# Patient Record
Sex: MALE | Race: WHITE | NOT HISPANIC OR LATINO | Employment: OTHER | URBAN - NONMETROPOLITAN AREA
[De-identification: names, ages, dates, MRNs, and addresses within clinical notes are randomized per-mention and may not be internally consistent; named-entity substitution may affect disease eponyms.]

---

## 2021-02-08 ENCOUNTER — TELEPHONE (OUTPATIENT)
Dept: CARDIOLOGY | Facility: CLINIC | Age: 61
End: 2021-02-08

## 2021-02-09 NOTE — TELEPHONE ENCOUNTER
I do not have any records about him, but in general there is not contraindications from the heart., please discuss with PCP

## 2021-02-22 ENCOUNTER — TELEPHONE (OUTPATIENT)
Dept: CARDIOLOGY | Facility: CLINIC | Age: 61
End: 2021-02-22

## 2021-02-22 ENCOUNTER — OFFICE VISIT (OUTPATIENT)
Dept: CARDIOLOGY | Facility: CLINIC | Age: 61
End: 2021-02-22

## 2021-02-22 VITALS
WEIGHT: 205 LBS | BODY MASS INDEX: 28.7 KG/M2 | RESPIRATION RATE: 12 BRPM | DIASTOLIC BLOOD PRESSURE: 76 MMHG | OXYGEN SATURATION: 98 % | HEART RATE: 63 BPM | HEIGHT: 71 IN | SYSTOLIC BLOOD PRESSURE: 94 MMHG

## 2021-02-22 DIAGNOSIS — G47.33 OBSTRUCTIVE SLEEP APNEA ON CPAP: ICD-10-CM

## 2021-02-22 DIAGNOSIS — Z99.89 OBSTRUCTIVE SLEEP APNEA ON CPAP: ICD-10-CM

## 2021-02-22 DIAGNOSIS — I42.0 CARDIOMYOPATHY, DILATED (HCC): ICD-10-CM

## 2021-02-22 DIAGNOSIS — I48.0 PAROXYSMAL ATRIAL FIBRILLATION (HCC): Primary | ICD-10-CM

## 2021-02-22 PROCEDURE — 99443 PR PHYS/QHP TELEPHONE EVALUATION 21-30 MIN: CPT | Performed by: INTERNAL MEDICINE

## 2021-02-22 NOTE — PROGRESS NOTES
[facility]  [facility] CARDIOLOGY  [address] [place] [address]  Dept: [phone]  Dept Fax: [phone]    Reed Clarke Jr.  1960    Televisit Note    You have chosen to receive care through a telephone visit. Do you consent to use a telephone visit for your medical care today? Yes    History of Present Illness:  Reed Clarke Jr. is a 60 y.o. male who presents via phone for Follow-up. Paroxysmal atrial fibrillation-He is 60 year sold and he is s/p cardioversion in 2019 x2 since then on sotalol 80 mg bid, and also Xarelto 20 mg, , no complaints, we need an EKG .     The following portions of the patient's history were reviewed and updated as appropriate: allergies, current medications, past family history, past medical history, past social history, past surgical history and problem list.    This visit was scheduled as a phone visit to comply with patient safety concerns in accordance with CDC recommendations.  Time spent talkinto  the patient was 30 minutes.     Medications  rivaroxaban  sotalol    Subjective  No Known Allergies     Past Medical History:   Diagnosis Date   • BMI 27.0-27.9,adult    • Mild sleep apnea    • Paroxysmal atrial fibrillation (CMS/HCC)    • Secondary cardiomyopathy (CMS/HCC)        Past Surgical History:   Procedure Laterality Date   • TONSILLECTOMY     • WISDOM TOOTH EXTRACTION         Family History   Problem Relation Age of Onset   • Prostate cancer Father    • Diabetes Other         Social History     Socioeconomic History   • Marital status:      Spouse name: Not on file   • Number of children: Not on file   • Years of education: Not on file   • Highest education level: Not on file   Tobacco Use   • Smoking status: Never Smoker   • Smokeless tobacco: Current User     Types: Chew   Substance and Sexual Activity   • Alcohol use: Yes     Comment: once or twice a month   • Drug use: Never   • Sexual activity: Defer       Cardiovascular  "Procedures    ECHO/MUGA:   STRESS TESTS:   CARDIAC CATH:   DEVICES:   HOLTER:   CT/MRI:   VASCULAR:   CARDIOTHORACIC:     Objective  Vitals:    02/22/21 0918   BP: 94/76  Comment: at home   BP Location: Left arm   Patient Position: Sitting   Cuff Size: Adult   Pulse: 63   Resp: 12   SpO2: 98%   Weight: 93 kg (205 lb)   Height: 180.3 cm (71\")   PainSc: 0-No pain     Body mass index is 28.59 kg/m².    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    Andrea Hernández MD  02/22/2021  "

## 2021-02-23 ENCOUNTER — CLINICAL SUPPORT (OUTPATIENT)
Dept: CARDIOLOGY | Facility: CLINIC | Age: 61
End: 2021-02-23

## 2021-02-23 DIAGNOSIS — I48.0 PAROXYSMAL ATRIAL FIBRILLATION (HCC): Primary | ICD-10-CM

## 2021-02-23 DIAGNOSIS — E78.2 MIXED HYPERLIPIDEMIA: ICD-10-CM

## 2021-02-23 DIAGNOSIS — I42.0 CARDIOMYOPATHY, DILATED (HCC): ICD-10-CM

## 2021-02-23 DIAGNOSIS — I48.0 AF (PAROXYSMAL ATRIAL FIBRILLATION) (HCC): Primary | ICD-10-CM

## 2021-02-23 PROCEDURE — 93000 ELECTROCARDIOGRAM COMPLETE: CPT | Performed by: INTERNAL MEDICINE

## 2021-02-23 RX ORDER — SOTALOL HYDROCHLORIDE 80 MG/1
80 TABLET ORAL 2 TIMES DAILY
Qty: 90 TABLET | Refills: 3 | Status: SHIPPED | OUTPATIENT
Start: 2021-02-23 | End: 2021-08-23 | Stop reason: SDUPTHER

## 2021-02-23 NOTE — PROGRESS NOTES
ECG 12 Lead    Date/Time: 2/23/2021 9:56 AM  Performed by: Andrea Hernández MD  Authorized by: Andrea Hernández MD   Comparison: compared with previous ECG from 6/26/2019  Rhythm: sinus bradycardia  Rate: normal  BPM: 54  QRS axis: normal    Clinical impression: normal ECG

## 2021-04-26 DIAGNOSIS — I48.0 AF (PAROXYSMAL ATRIAL FIBRILLATION) (HCC): ICD-10-CM

## 2021-08-23 DIAGNOSIS — I48.0 AF (PAROXYSMAL ATRIAL FIBRILLATION) (HCC): ICD-10-CM

## 2021-08-23 RX ORDER — SOTALOL HYDROCHLORIDE 80 MG/1
80 TABLET ORAL 2 TIMES DAILY
Qty: 180 TABLET | Refills: 1 | Status: SHIPPED | OUTPATIENT
Start: 2021-08-23 | End: 2022-02-21 | Stop reason: SDUPTHER

## 2021-08-31 ENCOUNTER — OFFICE VISIT (OUTPATIENT)
Dept: CARDIOLOGY | Facility: CLINIC | Age: 61
End: 2021-08-31

## 2021-08-31 VITALS
HEIGHT: 71 IN | SYSTOLIC BLOOD PRESSURE: 114 MMHG | OXYGEN SATURATION: 97 % | DIASTOLIC BLOOD PRESSURE: 70 MMHG | WEIGHT: 200 LBS | HEART RATE: 57 BPM | RESPIRATION RATE: 15 BRPM | BODY MASS INDEX: 28 KG/M2 | TEMPERATURE: 97.5 F

## 2021-08-31 DIAGNOSIS — I42.0 CARDIOMYOPATHY, DILATED (HCC): ICD-10-CM

## 2021-08-31 DIAGNOSIS — I34.0 NONRHEUMATIC MITRAL VALVE REGURGITATION: ICD-10-CM

## 2021-08-31 DIAGNOSIS — G47.33 OBSTRUCTIVE SLEEP APNEA ON CPAP: ICD-10-CM

## 2021-08-31 DIAGNOSIS — Z99.89 OBSTRUCTIVE SLEEP APNEA ON CPAP: ICD-10-CM

## 2021-08-31 DIAGNOSIS — I48.0 PAROXYSMAL ATRIAL FIBRILLATION (HCC): Primary | ICD-10-CM

## 2021-08-31 PROCEDURE — 99214 OFFICE O/P EST MOD 30 MIN: CPT | Performed by: INTERNAL MEDICINE

## 2021-08-31 PROCEDURE — 93000 ELECTROCARDIOGRAM COMPLETE: CPT | Performed by: INTERNAL MEDICINE

## 2021-08-31 NOTE — PROGRESS NOTES
[facility] [place]  [facility] CARDIOLOGY  [address] [place] [address]  Dept: [phone]  Dept Fax: [phone]    Reed Clarke Jr.  1960    Follow Up Office Visit Note    History of Present Illness:  Reed Clarke Jr. is a 61 y.o. male who presents to the clinic for Follow-up. PAF- He is s.p cardioversion in 2019,    Since then doing good on sotalol 80 mg bid, and Xarelto 20 mg, , his EKG sinus rhythm Hr 51 . Qt is fine, he has occasional some palpitations, that last 5-10 minutes, , he has the Kardia device., will see     The following portions of the patient's history were reviewed and updated as appropriate: allergies, current medications, past family history, past medical history, past social history, past surgical history and problem list.    Medications:  rivaroxaban  sotalol    Subjective  No Known Allergies     Past Medical History:   Diagnosis Date   • BMI 27.0-27.9,adult    • Mild sleep apnea    • Paroxysmal atrial fibrillation (CMS/HCC)    • Secondary cardiomyopathy (CMS/HCC)        Past Surgical History:   Procedure Laterality Date   • TONSILLECTOMY     • WISDOM TOOTH EXTRACTION         Family History   Problem Relation Age of Onset   • Prostate cancer Father    • Diabetes Other         Social History     Socioeconomic History   • Marital status:      Spouse name: Not on file   • Number of children: Not on file   • Years of education: Not on file   • Highest education level: Not on file   Tobacco Use   • Smoking status: Never Smoker   • Smokeless tobacco: Current User     Types: Chew   Substance and Sexual Activity   • Alcohol use: Yes     Comment: once or twice a month   • Drug use: Never   • Sexual activity: Defer       Review of Systems   Constitutional: Negative.    HENT: Negative.    Respiratory: Negative.    Cardiovascular: Positive for palpitations.   Endocrine: Negative.    Genitourinary: Negative.    Musculoskeletal: Negative.    Skin: Negative.   "  Allergic/Immunologic: Negative.    Neurological: Negative.    Hematological: Negative.    Psychiatric/Behavioral: Negative.    All other systems reviewed and are negative.      Cardiovascular Procedures    ECHO/MUGA:   STRESS TESTS:   CARDIAC CATH:   DEVICES:   HOLTER:   CT/MRI:   VASCULAR:   CARDIOTHORACIC:     Objective  Vitals:    08/31/21 1525   BP: 114/70   BP Location: Left arm   Patient Position: Sitting   Cuff Size: Adult   Pulse: 57   Resp: 15   Temp: 97.5 °F (36.4 °C)   TempSrc: Infrared   SpO2: 97%   Weight: 90.7 kg (200 lb)   Height: 180.3 cm (71\")   PainSc: 0-No pain     Body mass index is 27.89 kg/m².     Physical Exam  Constitutional:       Appearance: Healthy appearance. Not in distress.   Neck:      Vascular: No JVR. JVD normal.   Pulmonary:      Effort: Pulmonary effort is normal.      Breath sounds: Normal breath sounds. No wheezing. No rhonchi. No rales.   Chest:      Chest wall: Not tender to palpatation.   Cardiovascular:      PMI at left midclavicular line. Normal rate. Regular rhythm. Normal S1. Normal S2.      Murmurs: There is no murmur.      No gallop. No click. No rub.   Pulses:     Intact distal pulses.   Edema:     Peripheral edema absent.   Abdominal:      General: Bowel sounds are normal.      Palpations: Abdomen is soft.      Tenderness: There is no abdominal tenderness.   Musculoskeletal: Normal range of motion.         General: No tenderness. Skin:     General: Skin is warm and dry.   Neurological:      General: No focal deficit present.      Mental Status: Alert and oriented to person, place and time.          Diagnostic Data    ECG 12 Lead    Date/Time: 8/31/2021 4:04 PM  Performed by: Andrea Hernández MD  Authorized by: Andrea Hernández MD   Rhythm: sinus rhythm and sinus bradycardia  Rate: bradycardic  BPM: 51  QRS axis: normal    Clinical impression: normal ECG            Assessment and Plan  Diagnoses and all orders for this visit:    Paroxysmal atrial " fibrillation (CMS/HCC)- s.po Cardioversion 2019 doing well, on Sotalol 80 mg bid and Xarelto 20 mg    Cardiomyopathy, dilated (CMS/HCC)- his Ef recover back to normal, asymptomatic    Obstructive sleep apnea on CPAP-  Mitral regurgitation- Moderate mR by last echo 2020, asymptomatic, no SOB, no edema,          No follow-ups on file.    Andrea Hernández MD  08/31/2021

## 2022-02-21 ENCOUNTER — TELEPHONE (OUTPATIENT)
Dept: CARDIOLOGY | Facility: CLINIC | Age: 62
End: 2022-02-21

## 2022-02-21 DIAGNOSIS — I48.0 AF (PAROXYSMAL ATRIAL FIBRILLATION): ICD-10-CM

## 2022-02-21 RX ORDER — SOTALOL HYDROCHLORIDE 80 MG/1
80 TABLET ORAL 2 TIMES DAILY
Qty: 60 TABLET | Refills: 0 | Status: SHIPPED | OUTPATIENT
Start: 2022-02-21 | End: 2022-03-28 | Stop reason: SDUPTHER

## 2022-03-01 ENCOUNTER — OFFICE VISIT (OUTPATIENT)
Dept: CARDIOLOGY | Facility: CLINIC | Age: 62
End: 2022-03-01

## 2022-03-01 VITALS
WEIGHT: 200 LBS | BODY MASS INDEX: 28 KG/M2 | DIASTOLIC BLOOD PRESSURE: 82 MMHG | HEIGHT: 71 IN | OXYGEN SATURATION: 99 % | TEMPERATURE: 98 F | HEART RATE: 59 BPM | SYSTOLIC BLOOD PRESSURE: 138 MMHG | RESPIRATION RATE: 14 BRPM

## 2022-03-01 DIAGNOSIS — I48.0 AF (PAROXYSMAL ATRIAL FIBRILLATION): ICD-10-CM

## 2022-03-01 DIAGNOSIS — I34.0 NONRHEUMATIC MITRAL VALVE REGURGITATION: ICD-10-CM

## 2022-03-01 DIAGNOSIS — G47.33 OBSTRUCTIVE SLEEP APNEA ON CPAP: ICD-10-CM

## 2022-03-01 DIAGNOSIS — I42.0 CARDIOMYOPATHY, DILATED: Primary | ICD-10-CM

## 2022-03-01 DIAGNOSIS — Z99.89 OBSTRUCTIVE SLEEP APNEA ON CPAP: ICD-10-CM

## 2022-03-01 PROCEDURE — 99214 OFFICE O/P EST MOD 30 MIN: CPT | Performed by: INTERNAL MEDICINE

## 2022-03-01 PROCEDURE — 93000 ELECTROCARDIOGRAM COMPLETE: CPT | Performed by: INTERNAL MEDICINE

## 2022-03-01 RX ORDER — ASPIRIN 81 MG/1
81 TABLET ORAL DAILY
Qty: 90 TABLET | Refills: 3 | Status: SHIPPED | OUTPATIENT
Start: 2022-03-01 | End: 2022-09-01

## 2022-03-01 NOTE — PROGRESS NOTES
[facility]  [facility] CARDIOLOGY  [address] [place] [address]  Dept: [phone]  Dept Fax: [phone]    Reed Calrke Jr.  1960    Follow Up Office Visit Note    History of Present Illness:  Reed Clarke Jr. is a 61 y.o. male who presents to the clinic for Follow-up. Paroxysmal atrial fibrillation- He is asymptomatic, since cardioversion, 2019, no complaints, on Sotalol 80 mg bid, his Ef recover to normal his FINN score is 0 now, will change Xarelto to ASA.    The following portions of the patient's history were reviewed and updated as appropriate: allergies, current medications, past family history, past medical history, past social history, past surgical history and problem list.    Medications:  aspirin  sotalol    Subjective  No Known Allergies     Past Medical History:   Diagnosis Date   • BMI 27.0-27.9,adult    • Mild sleep apnea    • Paroxysmal atrial fibrillation (HCC)    • Secondary cardiomyopathy (HCC)        Past Surgical History:   Procedure Laterality Date   • TONSILLECTOMY     • WISDOM TOOTH EXTRACTION         Family History   Problem Relation Age of Onset   • Prostate cancer Father    • Diabetes Other         Social History     Socioeconomic History   • Marital status:    Tobacco Use   • Smoking status: Never Smoker   • Smokeless tobacco: Current User     Types: Chew   Vaping Use   • Vaping Use: Never used   Substance and Sexual Activity   • Alcohol use: Yes     Comment: once or twice a month   • Drug use: Never   • Sexual activity: Defer       Review of Systems   Constitutional: Negative.    HENT: Negative.    Respiratory: Negative.    Cardiovascular: Negative.    Endocrine: Negative.    Genitourinary: Negative.    Musculoskeletal: Negative.    Skin: Negative.    Allergic/Immunologic: Negative.    Neurological: Negative.    Hematological: Negative.    Psychiatric/Behavioral: Negative.        Cardiovascular Procedures    ECHO/MUGA:   STRESS  "TESTS:   CARDIAC CATH:   DEVICES:   HOLTER:   CT/MRI:   VASCULAR:   CARDIOTHORACIC:     Objective  Vitals:    03/01/22 1009   BP: 138/82   BP Location: Left arm   Patient Position: Lying   Cuff Size: Adult   Pulse: 59   Resp: 14   Temp: 98 °F (36.7 °C)   TempSrc: Infrared   SpO2: 99%   Weight: 90.7 kg (200 lb)   Height: 180.3 cm (71\")   PainSc: 0-No pain     Body mass index is 27.89 kg/m².     Physical Exam  Constitutional:       Appearance: Healthy appearance. Not in distress.   Neck:      Vascular: No JVR. JVD normal.   Pulmonary:      Effort: Pulmonary effort is normal.      Breath sounds: Normal breath sounds. No wheezing. No rhonchi. No rales.   Chest:      Chest wall: Not tender to palpatation.   Cardiovascular:      PMI at left midclavicular line. Normal rate. Regular rhythm. Normal S1. Normal S2.      Murmurs: There is no murmur.      No gallop. No click. No rub.   Pulses:     Intact distal pulses.   Edema:     Peripheral edema absent.   Abdominal:      General: Bowel sounds are normal.      Palpations: Abdomen is soft.      Tenderness: There is no abdominal tenderness.   Musculoskeletal: Normal range of motion.         General: No tenderness. Skin:     General: Skin is warm and dry.   Neurological:      General: No focal deficit present.      Mental Status: Alert and oriented to person, place and time.          Diagnostic Data    ECG 12 Lead    Date/Time: 3/1/2022 10:35 AM  Performed by: Andrea Hernández MD  Authorized by: Andrea Hernández MD   Comparison: compared with previous ECG from 8/31/2021  Similar to previous ECG  Comparison to previous ECG:   Rhythm: sinus rhythm and sinus bradycardia  Rate: bradycardic  BPM: 53  QRS axis: normal    Clinical impression: normal ECG            Assessment and Plan  Diagnoses and all orders for this visit:      Obstructive sleep apnea on CPAP    Nonrheumatic mitral valve regurgitation- Asymptomatic, last echo moderate MR 2020    AF (paroxysmal atrial " fibrillation) (HCC)- in Sinus doing well, no complaints on Sotalol 80 mg bid QT is fine, his FINN score is 0, will change to ASA    Other orders  -     aspirin (aspirin) 81 MG EC tablet; Take 1 tablet by mouth Daily.         Return in about 6 months (around 9/1/2022) for Recheck.    Andrea Hernández MD  03/01/2022

## 2022-03-11 ENCOUNTER — TELEPHONE (OUTPATIENT)
Dept: CARDIOLOGY | Facility: CLINIC | Age: 62
End: 2022-03-11

## 2022-03-11 NOTE — TELEPHONE ENCOUNTER
Spoke with Mr. Clarke and advised him that per Dr. box you can stop Xarelto today and start taking your aspirin tomorrow. He verbalized understanding.

## 2022-03-11 NOTE — TELEPHONE ENCOUNTER
SHOULD HE FINISH XERLTO AND START BABY ASPRIN  OR TAKE BABY ASPRIN AND XERLTO AT THE SAME TIME HE NEEDS DIRECTIONS     PLEASE CALL HIM    [phone]

## 2022-03-28 ENCOUNTER — TELEPHONE (OUTPATIENT)
Dept: CARDIOLOGY | Facility: CLINIC | Age: 62
End: 2022-03-28

## 2022-03-28 DIAGNOSIS — I48.0 AF (PAROXYSMAL ATRIAL FIBRILLATION): ICD-10-CM

## 2022-03-28 RX ORDER — SOTALOL HYDROCHLORIDE 80 MG/1
80 TABLET ORAL 2 TIMES DAILY
Qty: 180 TABLET | Refills: 1 | Status: SHIPPED | OUTPATIENT
Start: 2022-03-28 | End: 2022-09-01 | Stop reason: SDUPTHER

## 2022-03-28 NOTE — TELEPHONE ENCOUNTER
sotalol (BETAPACE) 80 MG tablet      [facility] - [place], [place] - [address] AT [address] [facility] - [phone]  - [phone] FX

## 2022-07-21 ENCOUNTER — TELEPHONE (OUTPATIENT)
Dept: FAMILY MEDICINE CLINIC | Facility: CLINIC | Age: 62
End: 2022-07-21

## 2022-07-21 NOTE — TELEPHONE ENCOUNTER
Pt called, tested positive for Covid, wanted a telehealth appt but none available. So he asked if a docotr could call him and send in the anti-viral med.     [phone]

## 2022-07-26 NOTE — TELEPHONE ENCOUNTER
Called and talked to patient.  He tested positive for COVID last Thursday.  He has been sick for about 6 days.  Told him he is outside the window for any treatment.  He is feeling better but if he gets worse to go to emergency room

## 2022-09-01 ENCOUNTER — OFFICE VISIT (OUTPATIENT)
Dept: CARDIOLOGY | Facility: CLINIC | Age: 62
End: 2022-09-01

## 2022-09-01 ENCOUNTER — TELEPHONE (OUTPATIENT)
Dept: CARDIOLOGY | Facility: CLINIC | Age: 62
End: 2022-09-01

## 2022-09-01 VITALS
SYSTOLIC BLOOD PRESSURE: 118 MMHG | HEART RATE: 84 BPM | BODY MASS INDEX: 28.98 KG/M2 | HEIGHT: 71 IN | DIASTOLIC BLOOD PRESSURE: 82 MMHG | WEIGHT: 207 LBS | TEMPERATURE: 97.8 F | RESPIRATION RATE: 18 BRPM

## 2022-09-01 DIAGNOSIS — I34.0 NONRHEUMATIC MITRAL VALVE REGURGITATION: ICD-10-CM

## 2022-09-01 DIAGNOSIS — Z99.89 OBSTRUCTIVE SLEEP APNEA ON CPAP: ICD-10-CM

## 2022-09-01 DIAGNOSIS — I48.0 PAROXYSMAL ATRIAL FIBRILLATION: Primary | ICD-10-CM

## 2022-09-01 DIAGNOSIS — I42.0 CARDIOMYOPATHY, DILATED: ICD-10-CM

## 2022-09-01 DIAGNOSIS — G47.33 OBSTRUCTIVE SLEEP APNEA ON CPAP: ICD-10-CM

## 2022-09-01 DIAGNOSIS — I48.0 AF (PAROXYSMAL ATRIAL FIBRILLATION): ICD-10-CM

## 2022-09-01 PROCEDURE — 99214 OFFICE O/P EST MOD 30 MIN: CPT | Performed by: INTERNAL MEDICINE

## 2022-09-01 PROCEDURE — 93000 ELECTROCARDIOGRAM COMPLETE: CPT | Performed by: INTERNAL MEDICINE

## 2022-09-01 RX ORDER — SOTALOL HYDROCHLORIDE 160 MG/1
80 TABLET ORAL 2 TIMES DAILY
Qty: 180 TABLET | Refills: 2 | Status: SHIPPED | OUTPATIENT
Start: 2022-09-01 | End: 2022-09-08 | Stop reason: SDUPTHER

## 2022-09-01 NOTE — TELEPHONE ENCOUNTER
If pt calls back please tell him that we are going to do an echo. Yani will call him and schedule it . Hub can tell pt

## 2022-09-01 NOTE — PROGRESS NOTES
[facility]  [facility] CARDIOLOGY  [address] [place] [address]  Dept: [phone]  Dept Fax: [phone]    Reed Clarke Jr.  1960    Follow Up Office Visit Note    History of Present Illness:  Reed Clarke Jr. is a 62 y.o. male who presents to the clinic for paroxysmal atrial fibrillation- He has had the COVID last month, since then his monitor indicates a irregular heart beat, he denies any complaints, no chest pain, no SOB, BP is 120.80, he is back to AF he has been taking Sotalol 80 mg bid and QT is fine 410, Hr is 76., will increase Sotalol to 160 mg bid, will restart Xarelto due to possible cardioversion, he did have tachycardia induced cardiomyopathy in the past with recover of the Ef, he has cardioversion in 2019, might need to go for ablation after     The following portions of the patient's history were reviewed and updated as appropriate: allergies, current medications, past family history, past medical history, past social history, past surgical history and problem list.    Medications:  rivaroxaban  sotalol    Subjective  No Known Allergies     Past Medical History:   Diagnosis Date   • BMI 27.0-27.9,adult    • Mild sleep apnea    • Paroxysmal atrial fibrillation (HCC)    • Secondary cardiomyopathy (HCC)        Past Surgical History:   Procedure Laterality Date   • TONSILLECTOMY     • WISDOM TOOTH EXTRACTION         Family History   Problem Relation Age of Onset   • Prostate cancer Father    • Diabetes Other         Social History     Socioeconomic History   • Marital status:    Tobacco Use   • Smoking status: Never Smoker   • Smokeless tobacco: Current User     Types: Chew   Vaping Use   • Vaping Use: Never used   Substance and Sexual Activity   • Alcohol use: Yes     Comment: once or twice a month   • Drug use: Never   • Sexual activity: Defer       Review of Systems   Constitutional: Negative.    HENT: Negative.    Respiratory: Negative.   "  Cardiovascular: Negative.    Endocrine: Negative.    Genitourinary: Negative.    Musculoskeletal: Negative.    Skin: Negative.    Allergic/Immunologic: Negative.    Neurological: Negative.    Hematological: Negative.    Psychiatric/Behavioral: Negative.        Cardiovascular Procedures    ECHO/MUGA:   STRESS TESTS:   CARDIAC CATH:   DEVICES:   HOLTER:   CT/MRI:   VASCULAR:   CARDIOTHORACIC:     Objective  Vitals:    09/01/22 1536   BP: 118/82   Pulse: 84   Resp: 18   Temp: 97.8 °F (36.6 °C)   Weight: 93.9 kg (207 lb)   Height: 180.3 cm (70.98\")     Body mass index is 28.88 kg/m².     Physical Exam  Constitutional:       Appearance: Healthy appearance. Not in distress.   Neck:      Vascular: No JVR. JVD normal.   Pulmonary:      Effort: Pulmonary effort is normal.      Breath sounds: Normal breath sounds. No wheezing. No rhonchi. No rales.   Chest:      Chest wall: Not tender to palpatation.   Cardiovascular:      PMI at left midclavicular line. Normal rate. Irregularly irregular rhythm. Normal S1. Normal S2.      Murmurs: There is no murmur.      No gallop. No click. No rub.   Pulses:     Intact distal pulses.   Edema:     Peripheral edema absent.   Abdominal:      General: Bowel sounds are normal.      Palpations: Abdomen is soft.      Tenderness: There is no abdominal tenderness.   Musculoskeletal: Normal range of motion.         General: No tenderness. Skin:     General: Skin is warm and dry.   Neurological:      General: No focal deficit present.      Mental Status: Alert and oriented to person, place and time.          Diagnostic Data    ECG 12 Lead    Date/Time: 9/1/2022 4:10 PM  Performed by: Andrea Hernández MD  Authorized by: Andrea Hernández MD   Comparison: compared with previous ECG from 3/1/2022  Rhythm: atrial fibrillation  Rate: normal  BPM: 76  QRS axis: normal    Clinical impression: abnormal EKG            Assessment and Plan  Diagnoses and all orders for this visit:    Paroxysmal " atrial fibrillation (HCC)= - He is back to AF asymptomatic, will increase Sotalol to 160 mg bid , and restart Xarelto 20 mg daily    Nonrheumatic mitral valve regurgitation- moderate MR by last echo, asymptomatic,    Cardiomyopathy, dilated (HCC)- With recovery of the Ef to normal, will get a new echo    Obstructive sleep apnea on CPAP        Other orders  -     rivaroxaban (Xarelto) 20 MG tablet; Take 1 tablet by mouth Daily.         Return in about 1 week (around 9/8/2022) for Recheck.    Andrea Hernández MD  09/01/2022

## 2022-09-08 ENCOUNTER — TELEPHONE (OUTPATIENT)
Dept: CARDIOLOGY | Facility: CLINIC | Age: 62
End: 2022-09-08

## 2022-09-08 ENCOUNTER — OFFICE VISIT (OUTPATIENT)
Dept: CARDIOLOGY | Facility: CLINIC | Age: 62
End: 2022-09-08

## 2022-09-08 VITALS
DIASTOLIC BLOOD PRESSURE: 74 MMHG | HEIGHT: 70 IN | RESPIRATION RATE: 12 BRPM | WEIGHT: 206.2 LBS | SYSTOLIC BLOOD PRESSURE: 128 MMHG | TEMPERATURE: 97.5 F | OXYGEN SATURATION: 97 % | BODY MASS INDEX: 29.52 KG/M2 | HEART RATE: 80 BPM

## 2022-09-08 DIAGNOSIS — Z99.89 OBSTRUCTIVE SLEEP APNEA ON CPAP: ICD-10-CM

## 2022-09-08 DIAGNOSIS — I42.0 CARDIOMYOPATHY, DILATED: Primary | ICD-10-CM

## 2022-09-08 DIAGNOSIS — I48.0 AF (PAROXYSMAL ATRIAL FIBRILLATION): ICD-10-CM

## 2022-09-08 DIAGNOSIS — G47.33 OBSTRUCTIVE SLEEP APNEA ON CPAP: ICD-10-CM

## 2022-09-08 DIAGNOSIS — I34.0 NONRHEUMATIC MITRAL VALVE REGURGITATION: ICD-10-CM

## 2022-09-08 PROCEDURE — 99214 OFFICE O/P EST MOD 30 MIN: CPT | Performed by: INTERNAL MEDICINE

## 2022-09-08 PROCEDURE — 93000 ELECTROCARDIOGRAM COMPLETE: CPT | Performed by: INTERNAL MEDICINE

## 2022-09-08 RX ORDER — SOTALOL HYDROCHLORIDE 160 MG/1
160 TABLET ORAL 2 TIMES DAILY
Qty: 180 TABLET | Refills: 2 | Status: SHIPPED | OUTPATIENT
Start: 2022-09-08 | End: 2022-10-24 | Stop reason: SDUPTHER

## 2022-09-08 NOTE — PROGRESS NOTES
[facility]  [facility] CARDIOLOGY  [address] [place] [address]  Dept: [phone]  Dept Fax: [phone]    Reed Clarke Jr.  1960    Follow Up Office Visit Note    History of Present Illness:  Reed Clarke Jr. is a 62 y.o. male who presents to the clinic for Follow-up. Paroxysmal atrial fibrillation- He is asymptomatic, still on AF, echo normal EF, despite increasing Sotalol to 160 mg bid, , he is also  on Xarelto 20 mg daily    The following portions of the patient's history were reviewed and updated as appropriate: allergies, current medications, past family history, past medical history, past social history, past surgical history and problem list.    Medications:  rivaroxaban  sotalol    Subjective  No Known Allergies     Past Medical History:   Diagnosis Date   • BMI 27.0-27.9,adult    • Mild sleep apnea    • Paroxysmal atrial fibrillation (HCC)    • Secondary cardiomyopathy (HCC)        Past Surgical History:   Procedure Laterality Date   • TONSILLECTOMY     • WISDOM TOOTH EXTRACTION         Family History   Problem Relation Age of Onset   • Prostate cancer Father    • Diabetes Other         Social History     Socioeconomic History   • Marital status:    Tobacco Use   • Smoking status: Never Smoker   • Smokeless tobacco: Current User     Types: Chew   Vaping Use   • Vaping Use: Never used   Substance and Sexual Activity   • Alcohol use: Yes     Comment: once or twice a month   • Drug use: Never   • Sexual activity: Defer       Review of Systems   Constitutional: Negative.    HENT: Negative.    Respiratory: Negative.    Cardiovascular: Negative.    Endocrine: Negative.    Genitourinary: Negative.    Musculoskeletal: Negative.    Skin: Negative.    Allergic/Immunologic: Negative.    Neurological: Negative.    Hematological: Negative.    Psychiatric/Behavioral: Negative.        Cardiovascular Procedures    ECHO/MUGA:   STRESS TESTS:   CARDIAC CATH:   DEVICES:  "  HOLTER:   CT/MRI:   VASCULAR:   CARDIOTHORACIC:     Objective  Vitals:    09/08/22 0833   BP: 128/74   BP Location: Left arm   Patient Position: Lying   Cuff Size: Adult   Pulse: 80   Resp: 12   Temp: 97.5 °F (36.4 °C)   TempSrc: Infrared   SpO2: 97%   Weight: 93.5 kg (206 lb 3.2 oz)   Height: 177.8 cm (70\")     Body mass index is 29.59 kg/m².     Physical Exam  Constitutional:       Appearance: Healthy appearance. Not in distress.   Neck:      Vascular: No JVR. JVD normal.   Pulmonary:      Effort: Pulmonary effort is normal.      Breath sounds: Normal breath sounds. No wheezing. No rhonchi. No rales.   Chest:      Chest wall: Not tender to palpatation.   Cardiovascular:      PMI at left midclavicular line. Normal rate. Irregularly irregular rhythm. Normal S1. Normal S2.      Murmurs: There is no murmur.      No gallop. No click. No rub.   Pulses:     Intact distal pulses.   Edema:     Peripheral edema absent.   Abdominal:      General: Bowel sounds are normal.      Palpations: Abdomen is soft.      Tenderness: There is no abdominal tenderness.   Musculoskeletal: Normal range of motion.         General: No tenderness. Skin:     General: Skin is warm and dry.   Neurological:      General: No focal deficit present.      Mental Status: Alert and oriented to person, place and time.          Diagnostic Data    ECG 12 Lead    Date/Time: 9/8/2022 9:05 AM  Performed by: Andrea Hernández MD  Authorized by: Andrea Hernández MD   Comparison: compared with previous ECG from 9/1/2022  Similar to previous ECG  Rhythm: atrial fibrillation  Rate: normal  BPM: 72  QRS axis: normal  Other findings: non-specific ST-T wave changes    Clinical impression: normal ECG            Assessment and Plan  Diagnoses and all orders for this visit:    Cardiomyopathy, dilated (HCC)- With normalization of the Ef 55%,this was due to tachycardia induce cardiomyopathy  -     CBC & Differential  -     Comprehensive Metabolic " Panel    Nonrheumatic mitral valve regurgitation- Just Mild MR,    Obstructive sleep apnea on CPAP    AF (paroxysmal atrial fibrillation) (HCC)- Despite Sotalol 160 mg bid still on AF, will do cardioversion, keep Sotalol 160 mg bid QT is 403, keep Xarelto  -     sotalol (BETAPACE) 160 MG tablet; Take 1 tablet by mouth 2 (Two) Times a Day.  -     CBC & Differential  -     Comprehensive Metabolic Panel  -     Cardioversion External in Cardiology Department; Future         No follow-ups on file.    Andrea Hernández MD  09/08/2022

## 2022-09-09 ENCOUNTER — TELEPHONE (OUTPATIENT)
Dept: CARDIOLOGY | Facility: CLINIC | Age: 62
End: 2022-09-09

## 2022-09-09 LAB
ALBUMIN SERPL-MCNC: 4.3 G/DL (ref 3.8–4.8)
ALBUMIN/GLOB SERPL: 1.5 {RATIO} (ref 1.2–2.2)
ALP SERPL-CCNC: 83 IU/L (ref 44–121)
ALT SERPL-CCNC: 16 IU/L (ref 0–44)
AST SERPL-CCNC: 17 IU/L (ref 0–40)
BASOPHILS # BLD AUTO: 0 X10E3/UL (ref 0–0.2)
BASOPHILS NFR BLD AUTO: 1 %
BILIRUB SERPL-MCNC: 0.3 MG/DL (ref 0–1.2)
BUN SERPL-MCNC: 14 MG/DL (ref 8–27)
BUN/CREAT SERPL: 15 (ref 10–24)
CALCIUM SERPL-MCNC: 9.7 MG/DL (ref 8.6–10.2)
CHLORIDE SERPL-SCNC: 106 MMOL/L (ref 96–106)
CO2 SERPL-SCNC: 25 MMOL/L (ref 20–29)
CREAT SERPL-MCNC: 0.95 MG/DL (ref 0.76–1.27)
EGFRCR-CYS SERPLBLD CKD-EPI 2021: 90 ML/MIN/1.73
EOSINOPHIL # BLD AUTO: 0.2 X10E3/UL (ref 0–0.4)
EOSINOPHIL NFR BLD AUTO: 3 %
ERYTHROCYTE [DISTWIDTH] IN BLOOD BY AUTOMATED COUNT: 13.2 % (ref 11.6–15.4)
GLOBULIN SER CALC-MCNC: 2.8 G/DL (ref 1.5–4.5)
GLUCOSE SERPL-MCNC: 101 MG/DL (ref 65–99)
HCT VFR BLD AUTO: 47 % (ref 37.5–51)
HGB BLD-MCNC: 15.5 G/DL (ref 13–17.7)
IMM GRANULOCYTES # BLD AUTO: 0 X10E3/UL (ref 0–0.1)
IMM GRANULOCYTES NFR BLD AUTO: 0 %
LYMPHOCYTES # BLD AUTO: 1.9 X10E3/UL (ref 0.7–3.1)
LYMPHOCYTES NFR BLD AUTO: 28 %
MCH RBC QN AUTO: 29.1 PG (ref 26.6–33)
MCHC RBC AUTO-ENTMCNC: 33 G/DL (ref 31.5–35.7)
MCV RBC AUTO: 88 FL (ref 79–97)
MONOCYTES # BLD AUTO: 0.6 X10E3/UL (ref 0.1–0.9)
MONOCYTES NFR BLD AUTO: 8 %
NEUTROPHILS # BLD AUTO: 4.1 X10E3/UL (ref 1.4–7)
NEUTROPHILS NFR BLD AUTO: 60 %
PLATELET # BLD AUTO: 316 X10E3/UL (ref 150–450)
POTASSIUM SERPL-SCNC: 4.4 MMOL/L (ref 3.5–5.2)
PROT SERPL-MCNC: 7.1 G/DL (ref 6–8.5)
RBC # BLD AUTO: 5.32 X10E6/UL (ref 4.14–5.8)
SODIUM SERPL-SCNC: 144 MMOL/L (ref 134–144)
WBC # BLD AUTO: 6.9 X10E3/UL (ref 3.4–10.8)

## 2022-09-09 NOTE — TELEPHONE ENCOUNTER
Pt called and scheduled for Cardioversion only at [facility] for Friday the 16th at noon, to arrive at 11am front registration office. Nothing to eat or drink after midnight, need to take your medicines morning of procedure. You will need a . Pt verbalized understanding. Advised cath lab that patient confirmed.

## 2022-09-12 ENCOUNTER — TELEPHONE (OUTPATIENT)
Dept: CARDIOLOGY | Facility: CLINIC | Age: 62
End: 2022-09-12

## 2022-09-12 NOTE — TELEPHONE ENCOUNTER
Left a message for Mr. Clarke that he should come to Dr. box's office sometime on Thursday for an EKG to make sure he is still in Afib prior to his cardioversion on  Friday.  He can talk with Yvette.

## 2022-09-15 ENCOUNTER — TELEPHONE (OUTPATIENT)
Dept: CARDIOLOGY | Facility: CLINIC | Age: 62
End: 2022-09-15

## 2022-09-15 ENCOUNTER — CLINICAL SUPPORT (OUTPATIENT)
Dept: CARDIOLOGY | Facility: CLINIC | Age: 62
End: 2022-09-15

## 2022-09-15 DIAGNOSIS — I48.0 PAROXYSMAL ATRIAL FIBRILLATION: Primary | ICD-10-CM

## 2022-09-15 PROCEDURE — 93000 ELECTROCARDIOGRAM COMPLETE: CPT | Performed by: INTERNAL MEDICINE

## 2022-09-15 NOTE — PROGRESS NOTES
ECG 12 Lead    Date/Time: 9/15/2022 9:04 AM  Performed by: Andrea Hernández MD  Authorized by: Andrea Hernández MD   Comparison: compared with previous ECG from 9/8/2022  Comparison to previous ECG:   Rhythm: sinus rhythm and sinus bradycardia  Rate: bradycardic  BPM: 48  QRS axis: normal    Clinical impression: normal ECG

## 2022-09-20 ENCOUNTER — OFFICE VISIT (OUTPATIENT)
Dept: CARDIOLOGY | Facility: CLINIC | Age: 62
End: 2022-09-20

## 2022-09-20 VITALS
RESPIRATION RATE: 18 BRPM | DIASTOLIC BLOOD PRESSURE: 74 MMHG | TEMPERATURE: 97.4 F | SYSTOLIC BLOOD PRESSURE: 112 MMHG | WEIGHT: 206 LBS | HEIGHT: 70 IN | HEART RATE: 56 BPM | BODY MASS INDEX: 29.49 KG/M2 | OXYGEN SATURATION: 98 %

## 2022-09-20 DIAGNOSIS — I34.0 NONRHEUMATIC MITRAL VALVE REGURGITATION: ICD-10-CM

## 2022-09-20 DIAGNOSIS — I42.0 CARDIOMYOPATHY, DILATED: Primary | ICD-10-CM

## 2022-09-20 DIAGNOSIS — G47.33 OBSTRUCTIVE SLEEP APNEA ON CPAP: ICD-10-CM

## 2022-09-20 DIAGNOSIS — I48.0 PAROXYSMAL ATRIAL FIBRILLATION: ICD-10-CM

## 2022-09-20 DIAGNOSIS — Z99.89 OBSTRUCTIVE SLEEP APNEA ON CPAP: ICD-10-CM

## 2022-09-20 PROCEDURE — 93000 ELECTROCARDIOGRAM COMPLETE: CPT | Performed by: INTERNAL MEDICINE

## 2022-09-20 PROCEDURE — 99214 OFFICE O/P EST MOD 30 MIN: CPT | Performed by: INTERNAL MEDICINE

## 2022-09-20 NOTE — PROGRESS NOTES
[facility] [place]  [facility]OLOG[facility]  [address] [place] [address]  Dept: [phone]  Dept Fax: [phone]    Reed Clarke Jr.  1960    Follow Up Office Visit Note    History of Present Illness:  Reed Clarke Jr. is a 62 y.o. male who presents to the clinic for Follow-up. Paroxysmal atrial fibrillation- He seems asymptomatic, went back to sinus on higher dose of Sotalol 160 mg bid QT is fine 450 and HR 54. , he has twice cardioversion before and he has had tachycardia induce cardiomyopathy in 2019, I think he might benefit of ablation, will refer him to see Dr Chi,     The following portions of the patient's history were reviewed and updated as appropriate: allergies, current medications, past family history, past medical history, past social history, past surgical history and problem list.    Medications:  rivaroxaban  sotalol    Subjective  No Known Allergies     Past Medical History:   Diagnosis Date   • BMI 27.0-27.9,adult    • Mild sleep apnea    • Paroxysmal atrial fibrillation (HCC)    • Secondary cardiomyopathy (HCC)        Past Surgical History:   Procedure Laterality Date   • TONSILLECTOMY     • WISDOM TOOTH EXTRACTION         Family History   Problem Relation Age of Onset   • Prostate cancer Father    • Diabetes Other         Social History     Socioeconomic History   • Marital status:    Tobacco Use   • Smoking status: Never Smoker   • Smokeless tobacco: Current User     Types: Chew   Vaping Use   • Vaping Use: Never used   Substance and Sexual Activity   • Alcohol use: Yes     Comment: once or twice a month   • Drug use: Never   • Sexual activity: Defer       Review of Systems   Constitutional: Negative.    HENT: Negative.    Respiratory: Negative.    Cardiovascular: Negative.    Endocrine: Negative.    Genitourinary: Negative.    Musculoskeletal: Negative.    Skin: Negative.    Allergic/Immunologic: Negative.    Neurological: Negative.   "  Hematological: Negative.    Psychiatric/Behavioral: Negative.        Cardiovascular Procedures    ECHO/MUGA:   STRESS TESTS:   CARDIAC CATH:   DEVICES:   HOLTER:   CT/MRI:   VASCULAR:   CARDIOTHORACIC:     Objective  Vitals:    09/20/22 0838   BP: 112/74   BP Location: Left arm   Patient Position: Lying   Cuff Size: Adult   Pulse: 56   Resp: 18   Temp: 97.4 °F (36.3 °C)   TempSrc: Infrared   SpO2: 98%   Weight: 93.4 kg (206 lb)   Height: 177.8 cm (70\")   PainSc: 0-No pain     Body mass index is 29.56 kg/m².     Physical Exam  Constitutional:       Appearance: Healthy appearance. Not in distress.   Neck:      Vascular: No JVR. JVD normal.   Pulmonary:      Effort: Pulmonary effort is normal.      Breath sounds: Normal breath sounds. No wheezing. No rhonchi. No rales.   Chest:      Chest wall: Not tender to palpatation.   Cardiovascular:      PMI at left midclavicular line. Normal rate. Regular rhythm. Normal S1. Normal S2.      Murmurs: There is no murmur.      No gallop. No click. No rub.   Pulses:     Intact distal pulses.   Edema:     Peripheral edema absent.   Abdominal:      General: Bowel sounds are normal.      Palpations: Abdomen is soft.      Tenderness: There is no abdominal tenderness.   Musculoskeletal: Normal range of motion.         General: No tenderness. Skin:     General: Skin is warm and dry.   Neurological:      General: No focal deficit present.      Mental Status: Alert and oriented to person, place and time.          Diagnostic Data    ECG 12 Lead    Date/Time: 9/20/2022 9:07 AM  Performed by: Andrea Hernández MD  Authorized by: Andrea Hernández MD   Comparison: compared with previous ECG from 9/15/2022  Similar to previous ECG  Rhythm: sinus rhythm and sinus bradycardia  Rate: normal  BPM: 54  QRS axis: normal    Clinical impression: normal ECG            Assessment and Plan  Diagnoses and all orders for this visit:    Cardiomyopathy, dilated (HCC)- His Ef is back to normal after " cardioversion,     Nonrheumatic mitral valve regurgitation-Mild MR    Obstructive sleep apnea on CPAP- On CPAP    Paroxysmal atrial fibrillation (HCC)- He is back to sinus on high dose Sotalol, he has prior 2 cardioversion and has had tachycardia induce cardiomyopathy,will sent him to see Dr Chi for possible ablation  -     Ambulatory Referral to Cardiac Electrophysiology         Return in about 4 months (around 1/20/2023) for Recheck.    Andrea Hernández MD  09/20/2022

## 2022-10-24 ENCOUNTER — TELEPHONE (OUTPATIENT)
Dept: CARDIOLOGY | Facility: CLINIC | Age: 62
End: 2022-10-24

## 2022-10-24 DIAGNOSIS — I48.0 AF (PAROXYSMAL ATRIAL FIBRILLATION): ICD-10-CM

## 2022-10-24 RX ORDER — SOTALOL HYDROCHLORIDE 160 MG/1
160 TABLET ORAL 2 TIMES DAILY
Qty: 180 TABLET | Refills: 2 | Status: SHIPPED | OUTPATIENT
Start: 2022-10-24

## 2022-10-24 NOTE — TELEPHONE ENCOUNTER
"Caller: Reed Clarke Jr.    Relationship: Self    Best call back number: [phone]    Requested Prescriptions:   Requested Prescriptions     Pending Prescriptions Disp Refills   • sotalol (BETAPACE) 160 MG tablet 180 tablet 2     Sig: Take 1 tablet by mouth 2 (Two) Times a Day.        Pharmacy where request should be sent:  [facility] [id number] - [place], [place] - [address] - [phone]  - [phone] FX  [phone]    Additional details provided by patient:    PATIENT STATES THAT WHEN HE PICKED HIS PRESCRIPTION UP IN September, THE DIRECTIONS ON THE BOTTLE READ \"TAKE HALF 160MG TABLET, TWICE A DAY\" INSTEAD OF \"TAKE ONE 160MG TABLET TWICE A DAY\". HE TOOK IT AS DR. BARRON ORDERED, 160MG TABLET TWICE A DAY, THEREFORE HE IS ALMOST OUT AND THE PHARMACY WILL NOT REFILL IT.    Does the patient have less than a 3 day supply:  [] Yes  [x] No    Bunny Jett Rep   10/24/22 09:17 EDT       "

## 2022-10-24 NOTE — TELEPHONE ENCOUNTER
Pt had picked up an old prescription  and will get the new one today after 3 spoke with pt wife and she will tell him . She said thank you

## 2022-10-26 NOTE — PROGRESS NOTES
Cardiac Electrophysiology Outpatient Note  [facility]    Office Visit     Reed Clarke Jr.  [id number]  10/27/2022    Primary Care Physician: Héctor Kahn MD    Referred By: Andrea Hernández, *    CC: Paroxsymal atrial fibrillation    Problem List:  1. Paroxsymal atrial fibrillation  a. [id number]=9-6 (prior cardiomyopathy)  b. GISSELL 08/20/2019: EF 40%, severe LA enlargement, no MILTON clot. Successful ECV  c. Successful ECV 11/20/2019, Sotalol 80 mg BID  d. Echo 02/10/2020: EF 55-60%  e. Recurrent Afib in the setting on COVID-19 infection, Sotalol increased to 160 mg BID  f. Echo 09/06/2022: EF 62%, diastolic dysfunction, LV mass index increased, LA volume mildly increased, RA mildly dilated, moderate bileaflet mitral valve thickening, mild MR, moderate TR, RVSP 45-55 mmHg, mild aortic sclerosis  2. Valvular heart disease  a. Echo 09/06/2022: moderate bileaflet mitral valve thickening, mild MR, moderate TR, RVSP 45-55 mmHg, mild aortic sclerosis  3. Sleep apnea, on CPAP  4. COVID-19 infection August 2022    History of Present Illness:   Reed Clarke Jr. is a 62 y.o. male who presents to the electrophysiology clinic for consultation regarding paroxysmal atrial fibrillation. He has a history of atrial fibrillation that has been controlled on Sotalol 80 mg BID until he had COVID-19 August 2022 and afterwards began experiencing an irregular heartbeat and was found to be in A. Fib.   Sotalol dose was increased to 160 mg twice daily with return to normal sinus rhythm.    He overall is feeling well currently.  He is taking his sotalol and does not really have any symptoms.  He does have trouble knowing whether or not he is in atrial fibrillation or sinus rhythm.  He does not feel any palpitations.  His only way of really knowing is sometimes he feels an anxiety feeling but is unsure if this is related to atrial fibrillation but other than this it is by checking his blood  "pressure cuff which will sometimes show an irregular heartbeat.  He thinks he is maintaining sinus rhythm most of the time currently.  He denies any chest pain, dyspnea orthopnea, PND, lower extremity swelling.    Past Surgical History:   Procedure Laterality Date   • TONSILLECTOMY     • WISDOM TOOTH EXTRACTION         Family History   Problem Relation Age of Onset   • Prostate cancer Father    • Diabetes Other        Social History     Socioeconomic History   • Marital status:    Tobacco Use   • Smoking status: Never   • Smokeless tobacco: Current     Types: Chew   Vaping Use   • Vaping Use: Never used   Substance and Sexual Activity   • Alcohol use: Yes     Comment: once or twice a month   • Drug use: Never   • Sexual activity: Defer         Current Outpatient Medications:   •  acetaminophen (TYLENOL) 500 MG tablet, Take 1 tablet by mouth Every 6 (Six) Hours As Needed for Mild Pain., Disp: , Rfl:   •  rivaroxaban (Xarelto) 20 MG tablet, Take 1 tablet by mouth Daily., Disp: 30 tablet, Rfl: 6  •  sotalol (BETAPACE) 160 MG tablet, Take 1 tablet by mouth 2 (Two) Times a Day., Disp: 180 tablet, Rfl: 2    Allergies:   No Known Allergies      Vital Signs: Blood pressure 142/84, pulse (!) 49, height 177.8 cm (70\"), weight 93.8 kg (206 lb 12.8 oz), SpO2 96 %.    Physical Exam     GEN: Well nourished, well-developed, no acute distress  HEENT: Normocephalic, atraumatic, moist mucous membranes  NECK: Supple, no JVD, no thyromegaly, no lymphadenopathy  CARD: Regular rate and rhythm, normal S1 & S2 are present.  No murmur, gallop or rubs are appreciated.  LUNGS: Clear to auscultation bilateraly, normal respiratory effort  ABDOMEN: Soft, nontender, normal bowel sounds  EXTREMITIES: No gross deformities, no clubbing, cyanosis.  No Edema  SKIN: Warm, dry  NEURO: No focal deficits, alert and oriented x 3  PSYCHIATRIC: Normal affect and mood, appropriate use of semantics and logic.      Lab Results   Component Value Date    " GLUCOSE 101 (H) 09/08/2022    CALCIUM 9.7 09/08/2022     09/08/2022    K 4.4 09/08/2022    CO2 25 09/08/2022     09/08/2022    BUN 14 09/08/2022    CREATININE 0.95 09/08/2022    BCR 15 09/08/2022     Lab Results   Component Value Date    WBC 6.9 09/08/2022    HGB 15.5 09/08/2022    HCT 47.0 09/08/2022    MCV 88 09/08/2022     09/08/2022     No results found for: INR, PROTIME  No results found for: TSH, M0NINCQ, R6JSWXA, THYROIDAB     Results for orders placed in visit on 09/06/22    Adult Transthoracic Echo Complete W/ Cont if Necessary Per Protocol    Interpretation Summary  · Estimated left ventricular EF = 62% Estimated left ventricular EF was in agreement with the calculated left ventricular EF. Left ventricular ejection fraction appears to be 61 - 65%. Left ventricular systolic function is normal.  · Left ventricular diastolic function is consistent with (grade II w/high LAP) pseudonormalization.  · Left ventricular mass index is increased.  · Left atrial volume is mildly increased.  · The right atrial cavity is mildly dilated.  · There is moderate, bileaflet mitral valve thickening present.  · Mild mitral valve regurgitation is present.  · Moderate tricuspid valve regurgitation is present.  · Estimated right ventricular systolic pressure from tricuspid regurgitation is moderately elevated (45-55 mmHg).  · The right ventricular cavity is mildly dilated.  · Mild aortic sclerosis  · Moderate TR, Mild MR.  · No pericardial effusion      I personally viewed and interpreted the patient's EKG/Telemetry/lab data.      ECG 12 Lead    Date/Time: 10/27/2022 9:51 AM  Performed by: Frank Chi MD  Authorized by: Frank Chi MD   Comparison: compared with previous ECG from 9/20/2022  Similar to previous ECG  Comments: Sinus bradycardia,  ms            Reed PUENTE Vince Harper  reports that he has never smoked. His smokeless tobacco use includes chew.   .       Assessment & Plan    1. Paroxysmal  atrial fibrillation (HCC)  He has a history of probably persistent atrial fibrillation maintained on sotalol 160 mg twice daily.  He does seem to be maintaining sinus rhythm most of the time on this.  He is overall asymptomatic with his episodes of atrial fibrillation.  He remains on Xarelto for anticoagulation.  We had a long discussion about the pathophysiology of atrial fibrillation potential treatment options.  We discussed that it is very important to continue anticoagulation prevent stroke.  We also discussed the options of rate versus rhythm control as well as antiarrhythmic therapy and catheter ablation.  With the latter we discussed how the procedures performed, the likelihood of success and the potential risks.  We discussed that in the best scenario, 1 year post ablation there is an approximate 70- 75% chance he will maintain sinus rhythm.  His atrial fibrillation is likely slightly more persistent so his numbers may be slightly lower than this.  The goal will be to hopefully get him off of sotalol after an ablation, however we discussed that we cannot guarantee this.    At the current time, he is little hesitant to proceed with an ablation given that the success rate is only around 70 to 75%.  He overall feels well on the sotalol so I think this is reasonable currently.  We will have him continue on his current dose of sotalol.  We will continue to monitor and will readdress the possibility of doing an ablation in the future.    2.  Tachybradycardia syndrome  He does likely have underlying tachybradycardia syndrome with a heart rate currently in the high 40s on sotalol.  We discussed that the reason for intervention with this would be due to significant symptoms.  At the current time he is not limited by any symptoms.  He says his heart rate has been low all of his life and this has not been a huge issue.  We discussed the indications for permanent pacing and currently he does not have enough symptoms to  warrant this.  We will continue to address this in the future    3. Cardiomyopathy, dilated (HCC)  He has a history of cardiomyopathy that was may be related to atrial fibrillation and tachycardia.  He currently has normalized ejection fraction.  He follows with Dr. Hernández for this.       Follow Up:  Return in about 6 months (around 4/27/2023).

## 2022-10-27 ENCOUNTER — OFFICE VISIT (OUTPATIENT)
Dept: CARDIOLOGY | Facility: CLINIC | Age: 62
End: 2022-10-27

## 2022-10-27 VITALS
DIASTOLIC BLOOD PRESSURE: 84 MMHG | OXYGEN SATURATION: 96 % | HEART RATE: 49 BPM | SYSTOLIC BLOOD PRESSURE: 142 MMHG | BODY MASS INDEX: 29.6 KG/M2 | HEIGHT: 70 IN | WEIGHT: 206.8 LBS

## 2022-10-27 DIAGNOSIS — I48.0 PAROXYSMAL ATRIAL FIBRILLATION: Primary | ICD-10-CM

## 2022-10-27 DIAGNOSIS — I42.0 CARDIOMYOPATHY, DILATED: ICD-10-CM

## 2022-10-27 PROCEDURE — 93000 ELECTROCARDIOGRAM COMPLETE: CPT | Performed by: STUDENT IN AN ORGANIZED HEALTH CARE EDUCATION/TRAINING PROGRAM

## 2022-10-27 PROCEDURE — 99204 OFFICE O/P NEW MOD 45 MIN: CPT | Performed by: STUDENT IN AN ORGANIZED HEALTH CARE EDUCATION/TRAINING PROGRAM

## 2022-10-27 RX ORDER — ACETAMINOPHEN 500 MG
500 TABLET ORAL EVERY 6 HOURS PRN
COMMUNITY

## 2023-01-23 ENCOUNTER — OFFICE VISIT (OUTPATIENT)
Dept: CARDIOLOGY | Facility: CLINIC | Age: 63
End: 2023-01-23
Payer: COMMERCIAL

## 2023-01-23 VITALS
DIASTOLIC BLOOD PRESSURE: 70 MMHG | HEART RATE: 61 BPM | SYSTOLIC BLOOD PRESSURE: 112 MMHG | TEMPERATURE: 97.2 F | OXYGEN SATURATION: 98 % | RESPIRATION RATE: 18 BRPM | HEIGHT: 70 IN | BODY MASS INDEX: 29.49 KG/M2 | WEIGHT: 206 LBS

## 2023-01-23 DIAGNOSIS — Z99.89 OBSTRUCTIVE SLEEP APNEA ON CPAP: ICD-10-CM

## 2023-01-23 DIAGNOSIS — G47.33 OBSTRUCTIVE SLEEP APNEA ON CPAP: ICD-10-CM

## 2023-01-23 DIAGNOSIS — I48.19 ATRIAL FIBRILLATION, PERSISTENT: ICD-10-CM

## 2023-01-23 DIAGNOSIS — I42.0 CARDIOMYOPATHY, DILATED: Primary | ICD-10-CM

## 2023-01-23 DIAGNOSIS — I34.0 NONRHEUMATIC MITRAL VALVE REGURGITATION: ICD-10-CM

## 2023-01-23 PROBLEM — I48.0 PAROXYSMAL ATRIAL FIBRILLATION: Status: RESOLVED | Noted: 2021-02-22 | Resolved: 2023-01-23

## 2023-01-23 PROCEDURE — 99214 OFFICE O/P EST MOD 30 MIN: CPT | Performed by: INTERNAL MEDICINE

## 2023-01-23 PROCEDURE — 93000 ELECTROCARDIOGRAM COMPLETE: CPT | Performed by: INTERNAL MEDICINE

## 2023-01-23 NOTE — PROGRESS NOTES
[facility] [place]  [facility]OLOG[facility]  [address] [place] [address]  Dept: [phone]  Dept Fax: [phone]    Reed Clarke Jr.  1960    Follow Up Office Visit Note    History of Present Illness:  Reed Clarke Jr. is a 62 y.o. male who presents to the clinic for Atrial Fibrillation and Follow-up.He seems doing well, he noticed just one time on the BP reading was irregular, his HR is better today 58, in sinus on Sotalol 160 mg bid QT is 428. , has seen Dr Chi and will see him again in few months, will keep same approach for now    The following portions of the patient's history were reviewed and updated as appropriate: allergies, current medications, past family history, past medical history, past social history, past surgical history, and problem list.    Medications:  acetaminophen  rivaroxaban  sotalol    Subjective  No Known Allergies     Past Medical History:   Diagnosis Date   • BMI 27.0-27.9,adult    • Mild sleep apnea    • Paroxysmal atrial fibrillation (HCC)    • Secondary cardiomyopathy (HCC)        Past Surgical History:   Procedure Laterality Date   • TONSILLECTOMY     • WISDOM TOOTH EXTRACTION         Family History   Problem Relation Age of Onset   • Prostate cancer Father    • Diabetes Other         Social History     Socioeconomic History   • Marital status:    Tobacco Use   • Smoking status: Never   • Smokeless tobacco: Current     Types: Chew   Vaping Use   • Vaping Use: Never used   Substance and Sexual Activity   • Alcohol use: Yes     Comment: once or twice a month   • Drug use: Never   • Sexual activity: Defer       Review of Systems   Constitutional: Negative.    HENT: Negative.    Respiratory: Negative.    Cardiovascular: Negative.    Endocrine: Negative.    Genitourinary: Negative.    Musculoskeletal: Negative.    Skin: Negative.    Allergic/Immunologic: Negative.    Neurological: Negative.    Hematological: Negative.   "  Psychiatric/Behavioral: Negative.        Cardiovascular Procedures    ECHO/MUGA:  STRESS TESTS:   CARDIAC CATH:   DEVICES:   HOLTER:   CT/MRI:   VASCULAR:   CARDIOTHORACIC:     Objective  Vitals:    01/23/23 0838   BP: 112/70   BP Location: Right arm   Patient Position: Lying   Cuff Size: Adult   Pulse: 61   Resp: 18   Temp: 97.2 °F (36.2 °C)   TempSrc: Infrared   SpO2: 98%   Weight: 93.4 kg (206 lb)   Height: 177.8 cm (70\")   PainSc: 0-No pain     Body mass index is 29.56 kg/m².     Physical Exam  Constitutional:       Appearance: Healthy appearance. Not in distress.   Neck:      Vascular: No JVR. JVD normal.   Pulmonary:      Effort: Pulmonary effort is normal.      Breath sounds: Normal breath sounds. No wheezing. No rhonchi. No rales.   Chest:      Chest wall: Not tender to palpatation.   Cardiovascular:      PMI at left midclavicular line. Normal rate. Regular rhythm. Normal S1. Normal S2.      Murmurs: There is no murmur.      No gallop. No click. No rub.   Pulses:     Intact distal pulses.   Edema:     Peripheral edema absent.   Abdominal:      General: Bowel sounds are normal.      Palpations: Abdomen is soft.      Tenderness: There is no abdominal tenderness.   Musculoskeletal: Normal range of motion.         General: No tenderness. Skin:     General: Skin is warm and dry.   Neurological:      General: No focal deficit present.      Mental Status: Alert and oriented to person, place and time.          Diagnostic Data    ECG 12 Lead    Date/Time: 1/23/2023 8:58 AM  Performed by: Andrea Hernández MD  Authorized by: Andrea Hernández MD   Comparison: compared with previous ECG from 10/27/2022  Similar to previous ECG  Rhythm: sinus rhythm and sinus bradycardia  Rate: bradycardic  BPM: 58  QRS axis: normal    Clinical impression: normal ECG            Assessment and Plan  Diagnoses and all orders for this visit:    Cardiomyopathy, dilated (HCC)- His Ef normalized off all meds,    Nonrheumatic " mitral valve regurgitation- Mild MR, no symptoms    Obstructive sleep apnea on CPAP    Atrial fibrillation, persistent (HCC)- In sinus on Sotalol 160 mg bid and also Xarelto 20 mg, Hr is better 58 and QT is good 440, will keep same approach,         No follow-ups on file.    Andrea Hernández MD  01/23/2023

## 2023-05-15 RX ORDER — RIVAROXABAN 20 MG/1
TABLET, FILM COATED ORAL
Qty: 30 TABLET | Refills: 6 | Status: SHIPPED | OUTPATIENT
Start: 2023-05-15

## 2023-07-27 ENCOUNTER — OFFICE VISIT (OUTPATIENT)
Dept: CARDIOLOGY | Facility: CLINIC | Age: 63
End: 2023-07-27
Payer: COMMERCIAL

## 2023-07-27 VITALS
HEIGHT: 71 IN | DIASTOLIC BLOOD PRESSURE: 80 MMHG | OXYGEN SATURATION: 95 % | HEART RATE: 53 BPM | WEIGHT: 212 LBS | BODY MASS INDEX: 29.68 KG/M2 | SYSTOLIC BLOOD PRESSURE: 124 MMHG

## 2023-07-27 DIAGNOSIS — I42.0 CARDIOMYOPATHY, DILATED: Primary | ICD-10-CM

## 2023-07-27 DIAGNOSIS — I48.19 ATRIAL FIBRILLATION, PERSISTENT: ICD-10-CM

## 2023-07-27 PROCEDURE — 99214 OFFICE O/P EST MOD 30 MIN: CPT | Performed by: STUDENT IN AN ORGANIZED HEALTH CARE EDUCATION/TRAINING PROGRAM

## 2023-07-27 PROCEDURE — 93000 ELECTROCARDIOGRAM COMPLETE: CPT | Performed by: STUDENT IN AN ORGANIZED HEALTH CARE EDUCATION/TRAINING PROGRAM

## 2023-07-27 NOTE — PROGRESS NOTES
Cardiac Electrophysiology Outpatient Note  [facility]    Office Visit     Reed Clarke Jr.  [id number]  10/27/2022    Primary Care Physician: Héctor Kahn MD    Referred By: No ref. provider found    CC: Paroxsymal atrial fibrillation    Problem List:  Paroxsymal atrial fibrillation  [id number] (prior cardiomyopathy)  GISSELL 08/20/2019: EF 40%, severe LA enlargement, no MILTON clot. Successful ECV  Successful ECV 11/20/2019, Sotalol 80 mg BID  Echo 02/10/2020: EF 55-60%  Recurrent Afib in the setting on COVID-19 infection, Sotalol increased to 160 mg BID  Echo 09/06/2022: EF 62%, diastolic dysfunction, LV mass index increased, LA volume mildly increased, RA mildly dilated, moderate bileaflet mitral valve thickening, mild MR, moderate TR, RVSP 45-55 mmHg, mild aortic sclerosis  Valvular heart disease  Echo 09/06/2022: moderate bileaflet mitral valve thickening, mild MR, moderate TR, RVSP 45-55 mmHg, mild aortic sclerosis  Sleep apnea, on CPAP  COVID-19 infection August 2022    History of Present Illness:   Reed Clarke Jr. is a 62 y.o. male who presents to the electrophysiology clinic for follow-up regarding paroxysmal atrial fibrillation.  He is overall done well since his last visit.  He does not have any major complaints.  He is unaware when he has atrial fibrillation B symptoms.  He does sometimes notice on his blood pressure cuff and will say he has irregular heartbeat.  This has not been happening very often.  He does have fatigue but is unsure if this is related to age.  He does also note dizziness on standing.      Past Surgical History:   Procedure Laterality Date    TONSILLECTOMY      WISDOM TOOTH EXTRACTION         Family History   Problem Relation Age of Onset    Prostate cancer Father     Diabetes Other        Social History     Socioeconomic History    Marital status:    Tobacco Use    Smoking status: Never    Smokeless tobacco: Current     Types:  "Chew   Vaping Use    Vaping Use: Never used   Substance and Sexual Activity    Alcohol use: Yes     Comment: once or twice a month    Drug use: Never    Sexual activity: Defer         Current Outpatient Medications:     acetaminophen (TYLENOL) 500 MG tablet, Take 1 tablet by mouth Every 6 (Six) Hours As Needed for Mild Pain., Disp: , Rfl:     sotalol (BETAPACE) 160 MG tablet, Take 1 tablet by mouth 2 (Two) Times a Day., Disp: 180 tablet, Rfl: 2    Xarelto 20 MG tablet, TAKE ONE TABLET BY MOUTH DAILY, Disp: 30 tablet, Rfl: 6    Allergies:   No Known Allergies      Vital Signs: Blood pressure 124/80, pulse 53, height 180.3 cm (71\"), weight 96.2 kg (212 lb), SpO2 95 %.    Physical Exam     GEN: Well nourished, well-developed, no acute distress  HEENT: Normocephalic, atraumatic, moist mucous membranes  NECK: Supple, no JVD, no thyromegaly, no lymphadenopathy  CARD: Regular rate and rhythm, normal S1 & S2 are present.  No murmur, gallop or rubs are appreciated.  LUNGS: Clear to auscultation bilateraly, normal respiratory effort  ABDOMEN: Soft, nontender, normal bowel sounds  EXTREMITIES: No gross deformities, no clubbing, cyanosis.  No Edema  SKIN: Warm, dry  NEURO: No focal deficits, alert and oriented x 3  PSYCHIATRIC: Normal affect and mood, appropriate use of semantics and logic.      Lab Results   Component Value Date    GLUCOSE 101 (H) 09/08/2022    CALCIUM 9.7 09/08/2022     09/08/2022    K 4.4 09/08/2022    CO2 25 09/08/2022     09/08/2022    BUN 14 09/08/2022    CREATININE 0.95 09/08/2022    BCR 15 09/08/2022     Lab Results   Component Value Date    WBC 6.9 09/08/2022    HGB 15.5 09/08/2022    HCT 47.0 09/08/2022    MCV 88 09/08/2022     09/08/2022     No results found for: INR, PROTIME  No results found for: TSH, P6XAZHU, U2TWKYM, THYROIDAB     Results for orders placed in visit on 09/06/22    Adult Transthoracic Echo Complete W/ Cont if Necessary Per Protocol    Interpretation Summary  · " Estimated left ventricular EF = 62% Estimated left ventricular EF was in agreement with the calculated left ventricular EF. Left ventricular ejection fraction appears to be 61 - 65%. Left ventricular systolic function is normal.  · Left ventricular diastolic function is consistent with (grade II w/high LAP) pseudonormalization.  · Left ventricular mass index is increased.  · Left atrial volume is mildly increased.  · The right atrial cavity is mildly dilated.  · There is moderate, bileaflet mitral valve thickening present.  · Mild mitral valve regurgitation is present.  · Moderate tricuspid valve regurgitation is present.  · Estimated right ventricular systolic pressure from tricuspid regurgitation is moderately elevated (45-55 mmHg).  · The right ventricular cavity is mildly dilated.  · Mild aortic sclerosis  · Moderate TR, Mild MR.  · No pericardial effusion      I personally viewed and interpreted the patient's EKG/Telemetry/lab data.      ECG 12 Lead    Date/Time: 7/27/2023 1:05 PM  Performed by: Frank Chi MD  Authorized by: Frank Chi MD   Comparison: compared with previous ECG from 1/23/2023  Similar to previous ECG  Comments: Minus bradycardia without other abnormalities        Reed Clarke Jr.  reports that he has never smoked. His smokeless tobacco use includes chew.   .       Assessment & Plan    1. Paroxysmal atrial fibrillation (HCC)  He has a history of probably persistent atrial fibrillation maintained on sotalol 160 mg twice daily.  He does seem to be maintaining sinus rhythm most of the time on this.  He is overall asymptomatic with his episodes of atrial fibrillation.  He remains on Xarelto for anticoagulation.      We previously discussed options including a catheter ablation, but he wanted to hold off on this I think is reasonable.  Overall doing well on sotalol so we will continue the current dose.  QTc is within acceptable range.    2.  Tachybradycardia syndrome  He does likely have  underlying tachybradycardia syndrome with a heart rate currently in the high 40s on sotalol.  We discussed that the reason for intervention with this would be due to significant symptoms.  At the current time he is not limited by any symptoms.  He says his heart rate has been low all of his life and this has not been a huge issue.  We discussed the indications for permanent pacing and currently he does not have enough symptoms to warrant this.  We will continue to address this in the future    3. Cardiomyopathy, dilated (HCC)  He has a history of cardiomyopathy that was thought to be related to atrial fibrillation and tachycardia.  He currently has normalized ejection fraction.  He follows with Dr. Hernández for this.  His RVSP was elevated to 45 to 55 mmHg.  We will continue to monitor.       Follow Up:  Return in about 1 year (around 7/27/2024) for Recheck.

## 2023-08-23 ENCOUNTER — OFFICE VISIT (OUTPATIENT)
Dept: CARDIOLOGY | Facility: CLINIC | Age: 63
End: 2023-08-23
Payer: COMMERCIAL

## 2023-08-23 ENCOUNTER — TELEPHONE (OUTPATIENT)
Dept: CARDIOLOGY | Facility: CLINIC | Age: 63
End: 2023-08-23

## 2023-08-23 VITALS
HEART RATE: 74 BPM | RESPIRATION RATE: 18 BRPM | DIASTOLIC BLOOD PRESSURE: 70 MMHG | OXYGEN SATURATION: 96 % | BODY MASS INDEX: 29.26 KG/M2 | SYSTOLIC BLOOD PRESSURE: 102 MMHG | HEIGHT: 71 IN | WEIGHT: 209 LBS

## 2023-08-23 DIAGNOSIS — I34.0 NONRHEUMATIC MITRAL VALVE REGURGITATION: ICD-10-CM

## 2023-08-23 DIAGNOSIS — Z99.89 OBSTRUCTIVE SLEEP APNEA ON CPAP: ICD-10-CM

## 2023-08-23 DIAGNOSIS — G47.33 OBSTRUCTIVE SLEEP APNEA ON CPAP: ICD-10-CM

## 2023-08-23 DIAGNOSIS — I48.19 ATRIAL FIBRILLATION, PERSISTENT: Primary | ICD-10-CM

## 2023-08-23 DIAGNOSIS — I42.0 CARDIOMYOPATHY, DILATED: ICD-10-CM

## 2023-08-23 PROCEDURE — 93000 ELECTROCARDIOGRAM COMPLETE: CPT | Performed by: INTERNAL MEDICINE

## 2023-08-23 PROCEDURE — 99214 OFFICE O/P EST MOD 30 MIN: CPT | Performed by: INTERNAL MEDICINE

## 2023-08-23 NOTE — TELEPHONE ENCOUNTER
Spoke with Mr. Clarke and he confirmed the cardioversion at [facility] for 8/28 arrive at 6:15 surgery center.  Take medications with a sip of water and have a .  He verbalized understanding.

## 2023-08-23 NOTE — PROGRESS NOTES
[facility]  [facility]  [address] [place] [address]  Dept: [phone]  Dept Fax: [phone]    Reed Clarke Jr.  1960    Follow Up Office Visit Note    History of Present Illness:  Reed Clarke Jr. is a 63 y.o. male who presents to the clinic for Follow-up. PA Flutter- Fibrillation- He seems doing fine asymptomatic but he is back to flutter fibrillation, despite Sotalol 160 mg bid and Xarelto 20 mg dailyEKG Hr is 74 fibrill flutter    The following portions of the patient's history were reviewed and updated as appropriate: allergies, current medications, past family history, past medical history, past social history, past surgical history, and problem list.    Medications:  acetaminophen  sotalol  Xarelto tablet    Subjective  No Known Allergies     Past Medical History:   Diagnosis Date    BMI 27.0-27.9,adult     Mild sleep apnea     Paroxysmal atrial fibrillation     Secondary cardiomyopathy        Past Surgical History:   Procedure Laterality Date    TONSILLECTOMY      WISDOM TOOTH EXTRACTION         Family History   Problem Relation Age of Onset    Prostate cancer Father     Diabetes Other         Social History     Socioeconomic History    Marital status:    Tobacco Use    Smoking status: Never    Smokeless tobacco: Current     Types: Chew   Vaping Use    Vaping Use: Never used   Substance and Sexual Activity    Alcohol use: Yes     Comment: once or twice a month    Drug use: Never    Sexual activity: Defer       Review of Systems   Constitutional: Negative.    HENT: Negative.     Respiratory: Negative.     Cardiovascular: Negative.    Endocrine: Negative.    Genitourinary: Negative.    Musculoskeletal: Negative.    Skin: Negative.    Allergic/Immunologic: Negative.    Neurological: Negative.    Hematological: Negative.    Psychiatric/Behavioral: Negative.       Cardiovascular Procedures    ECHO/MUGA:  STRESS TESTS:   CARDIAC CATH:   DEVICES:  "  HOLTER:   CT/MRI:   VASCULAR:   CARDIOTHORACIC:     Objective  Vitals:    08/23/23 0831   BP: 102/70   BP Location: Left arm   Patient Position: Sitting   Cuff Size: Adult   Pulse: 74   Resp: 18   SpO2: 96%   Weight: 94.8 kg (209 lb)   Height: 180.3 cm (71\")   PainSc: 0-No pain     Body mass index is 29.15 kg/mý.     Physical Exam  Vitals reviewed.   Constitutional:       Appearance: Healthy appearance. Not in distress.   Neck:      Vascular: No JVR. JVD normal.   Pulmonary:      Effort: Pulmonary effort is normal.      Breath sounds: Normal breath sounds. No wheezing. No rhonchi. No rales.   Chest:      Chest wall: Not tender to palpatation.   Cardiovascular:      PMI at left midclavicular line. Normal rate. Irregularly irregular rhythm. Normal S1. Normal S2.       Murmurs: There is no murmur.      No gallop.  No click. No rub.   Pulses:     Intact distal pulses.   Edema:     Peripheral edema absent.   Abdominal:      General: Bowel sounds are normal.      Palpations: Abdomen is soft.      Tenderness: There is no abdominal tenderness.   Musculoskeletal: Normal range of motion.         General: No tenderness. Skin:     General: Skin is warm and dry.   Neurological:      General: No focal deficit present.      Mental Status: Alert and oriented to person, place and time.        Diagnostic Data    ECG 12 Lead    Date/Time: 8/23/2023 8:59 AM  Performed by: Andrea Hernández MD  Authorized by: Andrea Hernández MD   Comparison: compared with previous ECG from 7/23/2023  Rhythm: atrial fibrillation  Rate: normal  BPM: 74  QRS axis: normal    Clinical impression: abnormal EKG      Assessment and Plan  Diagnoses and all orders for this visit:    Atrial fibrillation, persistent= Will do cardioversion, will sent back to Dr Chi, I think he will benefit from ablation specially he is young and failed high dose Sotalol    Cardiomyopathy, dilated- With normalization of the Ef, likely tachycardia induce " cardiomyopathy    Nonrheumatic mitral valve regurgitation- Mild MR by last echo, normal EF    Obstructive sleep apnea on CPAP         Return in about 1 month (around 9/23/2023) for Recheck with Dr. Hernández.    Andrea Hernández MD  08/23/2023

## 2023-09-01 ENCOUNTER — TELEPHONE (OUTPATIENT)
Dept: CARDIOLOGY | Facility: CLINIC | Age: 63
End: 2023-09-01
Payer: COMMERCIAL

## 2023-09-01 DIAGNOSIS — I48.19 ATRIAL FIBRILLATION, PERSISTENT: Primary | ICD-10-CM

## 2023-09-01 NOTE — TELEPHONE ENCOUNTER
Will do cardioversion, will sent back to Dr Chi, I think he will benefit from ablation specially he is young and failed high dose Sotalol . Per Betty.

## 2023-09-01 NOTE — TELEPHONE ENCOUNTER
"Caller: Reed Clarke Jr. \"RUCHI\"    Relationship: Self    Best call back number: [phone]     What orders are you requesting (i.e. lab or imaging): ABLATION WITH     In what timeframe would the patient need to come in: ASAP    Where will you receive your lab/imaging services: [facility]    Additional notes: PATIENT DID NOT HAVE THE CARDIOVERSION DONE ON 08.28.23 DUE TO HIM BEING BACK IN RHYTHM. THE PATIENT WAS TOLD THAT ORDERS WOULD BE PLACED FOR  TO PERFORM AN ABLATION.      "

## 2023-09-11 ENCOUNTER — TELEPHONE (OUTPATIENT)
Dept: CARDIOLOGY | Facility: CLINIC | Age: 63
End: 2023-09-11

## 2023-09-11 NOTE — TELEPHONE ENCOUNTER
"  Caller: Reed Clarke Jr. \"RUCHI\"    Relationship: Self    Best call back number: [phone]    What is the best time to reach you: ANYTIME    Who are you requesting to speak with (clinical staff, provider,  specific staff member): CLINICAL        What was the call regarding: PATIENT IS SCHEDULED FOR A PROCEDURE ON 9-22-23 AND ALSO HAS APPOINTMENT ON 9-22-23 WITH DR. BARRON.  PATIENT WANTS TO KNOW IF HIS APPOINTMENT CAN BE MOVED UP SO PATIENT CAN HAVE SOME QUESTIONS ANSWERED,  OR IF DR. BARRON CAN CALL TO ANSWER SOME QUESTIONS.      Is it okay if the provider responds through [employer]: NO          "

## 2023-09-13 ENCOUNTER — PREP FOR SURGERY (OUTPATIENT)
Dept: OTHER | Facility: HOSPITAL | Age: 63
End: 2023-09-13
Payer: COMMERCIAL

## 2023-09-18 ENCOUNTER — TELEPHONE (OUTPATIENT)
Dept: CARDIOLOGY | Facility: CLINIC | Age: 63
End: 2023-09-18

## 2023-09-18 NOTE — TELEPHONE ENCOUNTER
"HUB UNABLE TO WARM TRANSFER  Caller: Reed Clarke Jr. \"RUCHI\"    Relationship: Self    Best call back number: [phone]    What is the best time to reach you: ANYTIME    Who are you requesting to speak with (clinical staff, provider,  specific staff member): CLINICAL    What was the call regarding: PATIENT WAS REFERRED BY DR. BARRON TO HAVE PROCEDURES ON WEDNESDAY AND FRIDAY OF THIS WEEK  WITH DR. MOORE.   PATIENT GOT A LETTER FROM INSURANCE SAYING THAT THE PROCEDURE ARE DENIED DUE TO BEING MEDICALLY UNNECESSARY.  PATIENT IS ALSO TO STOP MEDICATIONS TODAY FOR PROCEDURES... PLEASE REACH OUT TO PATIENT.      Is it okay if the provider responds through [employer]: NO        "

## 2023-09-18 NOTE — TELEPHONE ENCOUNTER
Patient has called our (Dr Hernández's) office multiple times regarding this message. Pt is unsure of what they need to do regarding next steps for the procedure or possible rescheduling. Since our office is not handling the auth, procedure scheduling, etc, we are unable to do anything. Please call pt back as soon as possible.

## 2023-09-19 ENCOUNTER — PREP FOR SURGERY (OUTPATIENT)
Dept: OTHER | Facility: HOSPITAL | Age: 63
End: 2023-09-19
Payer: COMMERCIAL

## 2023-09-19 ENCOUNTER — DOCUMENTATION (OUTPATIENT)
Dept: CARDIOLOGY | Facility: CLINIC | Age: 63
End: 2023-09-19
Payer: COMMERCIAL

## 2023-09-19 DIAGNOSIS — I48.19 ATRIAL FIBRILLATION, PERSISTENT: Primary | ICD-10-CM

## 2023-09-19 RX ORDER — NITROGLYCERIN 0.4 MG/1
0.4 TABLET SUBLINGUAL
Status: CANCELLED | OUTPATIENT
Start: 2023-09-19

## 2023-09-19 RX ORDER — ACETAMINOPHEN 325 MG/1
650 TABLET ORAL EVERY 4 HOURS PRN
Status: CANCELLED | OUTPATIENT
Start: 2023-09-19

## 2023-09-19 RX ORDER — SODIUM CHLORIDE 9 MG/ML
40 INJECTION, SOLUTION INTRAVENOUS AS NEEDED
Status: CANCELLED | OUTPATIENT
Start: 2023-09-19

## 2023-09-19 RX ORDER — ONDANSETRON 2 MG/ML
4 INJECTION INTRAMUSCULAR; INTRAVENOUS EVERY 6 HOURS PRN
Status: CANCELLED | OUTPATIENT
Start: 2023-09-19

## 2023-09-19 NOTE — PROGRESS NOTES
We discussed with the patient on the phone about his ongoing symptoms with atrial fibrillation.  He unfortunately has had progressive symptoms likely related to atrial fibrillation.  He has significant bradycardia due to his sotalol which has been required to control his atrial fibrillation.  With this he has significant fatigue that is interfering with his ability to perform activities.  We discussed multiple options going forward.  After this discussion, he elected to proceed with a catheter ablation in the hopes that he can get off of antiarrhythmic therapy to allow his heart rate to increase and improve his symptoms.  We will work on setting this up.

## 2023-09-20 ENCOUNTER — PRE-ADMISSION TESTING (OUTPATIENT)
Dept: PREADMISSION TESTING | Facility: HOSPITAL | Age: 63
End: 2023-09-20

## 2023-09-20 ENCOUNTER — TELEPHONE (OUTPATIENT)
Dept: CARDIOLOGY | Facility: CLINIC | Age: 63
End: 2023-09-20
Payer: COMMERCIAL

## 2023-09-20 ENCOUNTER — HOSPITAL ENCOUNTER (OUTPATIENT)
Dept: CT IMAGING | Facility: HOSPITAL | Age: 63
Discharge: HOME | End: 2023-09-20
Payer: COMMERCIAL

## 2023-09-20 DIAGNOSIS — I48.19 ATRIAL FIBRILLATION, PERSISTENT: ICD-10-CM

## 2023-09-20 DIAGNOSIS — R91.1 LESION OF LUNG: Primary | ICD-10-CM

## 2023-09-20 LAB
ANION GAP SERPL CALCULATED.3IONS-SCNC: 8 MMOL/L (ref 5–15)
BUN SERPL-MCNC: 8 MG/DL (ref 8–23)
BUN/CREAT SERPL: 9.6 (ref 7–25)
CALCIUM SPEC-SCNC: 9.2 MG/DL (ref 8.6–10.5)
CHLORIDE SERPL-SCNC: 105 MMOL/L (ref 98–107)
CO2 SERPL-SCNC: 28 MMOL/L (ref 22–29)
CREAT SERPL-MCNC: 0.83 MG/DL (ref 0.76–1.27)
DEPRECATED RDW RBC AUTO: 44.5 FL (ref 37–54)
EGFRCR SERPLBLD CKD-EPI 2021: 98.3 ML/MIN/1.73
ERYTHROCYTE [DISTWIDTH] IN BLOOD BY AUTOMATED COUNT: 13.1 % (ref 12.3–15.4)
GLUCOSE SERPL-MCNC: 99 MG/DL (ref 65–99)
HCT VFR BLD AUTO: 46.3 % (ref 37.5–51)
HGB BLD-MCNC: 14.9 G/DL (ref 13–17.7)
MCH RBC QN AUTO: 29.3 PG (ref 26.6–33)
MCHC RBC AUTO-ENTMCNC: 32.2 G/DL (ref 31.5–35.7)
MCV RBC AUTO: 91.1 FL (ref 79–97)
PLATELET # BLD AUTO: 326 10*3/MM3 (ref 140–450)
PMV BLD AUTO: 8.9 FL (ref 6–12)
POTASSIUM SERPL-SCNC: 4 MMOL/L (ref 3.5–5.2)
RBC # BLD AUTO: 5.08 10*6/MM3 (ref 4.14–5.8)
SODIUM SERPL-SCNC: 141 MMOL/L (ref 136–145)
TSH SERPL DL<=0.05 MIU/L-ACNC: 1.2 UIU/ML (ref 0.27–4.2)
WBC NRBC COR # BLD: 7.12 10*3/MM3 (ref 3.4–10.8)

## 2023-09-20 PROCEDURE — 84443 ASSAY THYROID STIM HORMONE: CPT

## 2023-09-20 PROCEDURE — 25510000001 IOPAMIDOL PER 1 ML: Performed by: INTERNAL MEDICINE

## 2023-09-20 PROCEDURE — 85027 COMPLETE CBC AUTOMATED: CPT

## 2023-09-20 PROCEDURE — 36415 COLL VENOUS BLD VENIPUNCTURE: CPT

## 2023-09-20 PROCEDURE — 71275 CT ANGIOGRAPHY CHEST: CPT

## 2023-09-20 PROCEDURE — 80048 BASIC METABOLIC PNL TOTAL CA: CPT

## 2023-09-20 RX ADMIN — IOPAMIDOL 85 ML: 755 INJECTION, SOLUTION INTRAVENOUS at 11:24

## 2023-09-20 NOTE — TELEPHONE ENCOUNTER
CT order placed.    Megha spoke with the patient earlier today. He stated that he does have a productive cough, but no fever. He is coughing up yellow sputum.

## 2023-09-20 NOTE — PAT
An arrival time for procedure was not provided during PAT visit. If patient had any questions or concerns about their arrival time, they were instructed to contact their surgeon/physician.  Additionally, if the patient referred to an arrival time that was acquired from their my chart account, patient was encouraged to verify that time with their surgeon/physician. Arrival times are NOT provided in Pre Admission Testing Department.    Patient viewed general PAT education video as instructed in their preoperative information received from their surgeon.  Patient stated the general PAT education video was viewed in its entirety and survey completed.  Copies of PAT general education handouts (Incentive Spirometry, Meds to Beds Program, Patient Belongings, Pre-op skin preparation instructions, Blood Glucose testing, Visitor policy, Surgery FAQ, Code H) distributed to patient if not printed. Education related to the PAT pass and skin preparation for surgery (if applicable) completed in PAT as a reinforcement to PAT education video. Patient instructed to return PAT pass provided today as well as completed skin preparation sheet (if applicable) on the day of procedure.     Additionally if patient had not viewed video yet but intended to view it at home or in our waiting area, then referred them to the handout with QR code/link provided during PAT visit.  Instructed patient to complete survey after viewing the video in its entirety.  Encouraged patient/family to read PAT general education handouts thoroughly and notify PAT staff with any questions or concerns. Patient verbalized understanding of all information and priority content.    Patient denies any current skin issues.     Patient directed to Radiology Department for CT after Pre Admission Testing Appointment.         
28-Nov-2021 14:34

## 2023-09-20 NOTE — DISCHARGE INSTRUCTIONS
"Dear Patient,    Drink plenty of fluids the day before your procedure to ensure you are well hydrated, unless otherwise directed by your physician.    Do NOT eat after midnight the night before your procedure.   You may have clear liquids only up to three hours before your scheduled arrival time (Water is best, but clear liquids can also include coffee without cream or milk, fruit juice without pulp, clear broth, and clear gelatin).  During the three hour pre-procedure timeframe, NOTHING BY MOUTH (NPO).    We encourage you to drink 8 ounces of water three to four hours before your scheduled arrival time.    Take your medications as instructed by your doctor.    Following your procedure, be sure to drink plenty of fluids to continue flushing the kidneys if dye was utilized during your procedure (cardiac catheterization)    Benefits of hydrating before and after your procedure include:    -Improved hydration helps prevent potential harm to the kidneys by flushing the contrast/dye used during your procedure (if applicable)    -Lower post-procedure complications    -Improved patient comfort     Do NOT smoke after midnight the night before your procedure.    Glasses and jewelry may be worn, but dentures must be removed prior to your procedure.    Leave any items you consider valuable at home.      MORNING of your Procedure, please bring the following:     -Photo ID and insurance card(s)    -ALL medications in their ORIGINAL CONTAINERS    -Co-pay and/or deductible required by your insurance   -Copy of living will or power of  document (if not brought to Pre-Admission Testing department)   -CPAP mask and tubing, not your machine (if applicable)   -Relaxation aids (music, books, magazines)    Family members may wait in [facility] waiting area during procedure.    Need to make arrangements for transportation prior to discharge.    A handout regarding \"Heart Healthy Eating\" was provided today to encourage healthy eating " habits.

## 2023-09-20 NOTE — TELEPHONE ENCOUNTER
----- Message from Frank Chi MD sent at 9/20/2023 12:19 PM EDT -----  To my giving this a call and see how he is feeling.  As long as he is feeling fine I think we can still proceed with the ablation on Friday.  We should get a CT with contrast of the chest to further evaluate his lung lesion.

## 2023-09-20 NOTE — NURSING NOTE
PRE-PVA ASSESSMENT  Reed Clarke JrAddison 1960   [address] [place] [address]   [phone]    Information obtained from: [x] Medical record review  [x] Patient report  Scheduled for: PVA on 9/22/23 with Dr. Chi  No Known Allergies    AFib Specific History:  AFIBTYPE: paroxysmal    CHADS-VASc Risk Assessment              0 Total Score        Criteria that do not apply:    CHF    Hypertension    Age >/= 75    DM    PRIOR STROKE/TIA/THROMBO    Vascular Disease    Age 65-74    Sex: Female          Anticoagulation: Xarelto 20 mg daily NO MISSED DOSES   Cardioversion x 2  Failed AAD(s): sotalol   Prior Ablation: No     Last Echo(s):  [x] TTE  09/06/2022: EF 62%, diastolic dysfunction, LV mass index increased, LA volume mildly increased, RA mildly dilated, moderate bileaflet mitral valve thickening, mild MR, moderate TR, RVSP 45-55 mmHg, mild aortic sclerosis     GISSELL 08/20/2019: EF 40%, severe LA enlargement, no MILTON clot. Successful ECV      Past medical History:   [] Diabetes   No              No results found for: HGBA1C       [] HYPOthyroidism No   [] HYPERthyroidism No        No results found for: TSH    [] HTN No     [x] CM   [] CVA   No                             [] TIA  No         [] Ischemic         [] Hemorrhagic         [] Nonischemic         [] Embolic        [] Diastolic    [] CAD  No        [] MI  No           [] Dyslipidemia No  [] Statin indicated    [] Ischemic Evaluation No     [x] Sleep Apnea Diagnosed       Device: CPAP        Compliance: compliance all of the time    [] Obesity No BMI 29.15      Summary of Patient Contact:    I spoke with Mr. Clarke about his upcoming PVA.   He was well informed about the procedure from prior discussion with Dr. Chi and from reading the provided literature.  We discussed the procedure at length including risks, anesthesia, intra-op procedures, recovery, bedrest, sheath removal, discharge criteria, normal post-procedure expectations, and success rates.      Pre procedure CTA reveals partially visualized probable consolidation in the right upper lobe and an adjacent nonspecific noncalcified 7mm pulmonary nodule.  Patient aware of findings.  Diagnostic Chest CT to evaluate further on 9/21 @ 5 pm.        Megha Rae RN

## 2023-09-21 ENCOUNTER — HOSPITAL ENCOUNTER (OUTPATIENT)
Dept: CT IMAGING | Facility: HOSPITAL | Age: 63
Discharge: HOME | End: 2023-09-21
Admitting: STUDENT IN AN ORGANIZED HEALTH CARE EDUCATION/TRAINING PROGRAM
Payer: COMMERCIAL

## 2023-09-21 DIAGNOSIS — R91.1 LESION OF LUNG: ICD-10-CM

## 2023-09-21 PROBLEM — I27.20 PULMONARY HTN: Status: ACTIVE | Noted: 2023-09-21

## 2023-09-21 PROBLEM — I38 HEART VALVE DISEASE: Status: ACTIVE | Noted: 2021-08-31

## 2023-09-21 PROBLEM — I48.0 PAROXYSMAL ATRIAL FIBRILLATION: Status: ACTIVE | Noted: 2023-01-23

## 2023-09-21 PROCEDURE — 71260 CT THORAX DX C+: CPT

## 2023-09-21 PROCEDURE — 25510000001 IOPAMIDOL 61 % SOLUTION: Performed by: STUDENT IN AN ORGANIZED HEALTH CARE EDUCATION/TRAINING PROGRAM

## 2023-09-21 RX ADMIN — IOPAMIDOL 75 ML: 612 INJECTION, SOLUTION INTRAVENOUS at 17:11

## 2023-09-22 ENCOUNTER — ANESTHESIA (OUTPATIENT)
Dept: CARDIOLOGY | Facility: HOSPITAL | Age: 63
End: 2023-09-22
Payer: COMMERCIAL

## 2023-09-22 ENCOUNTER — HOSPITAL ENCOUNTER (OUTPATIENT)
Facility: HOSPITAL | Age: 63
Setting detail: HOSPITAL OUTPATIENT SURGERY
Discharge: HOME | End: 2023-09-22
Attending: STUDENT IN AN ORGANIZED HEALTH CARE EDUCATION/TRAINING PROGRAM | Admitting: STUDENT IN AN ORGANIZED HEALTH CARE EDUCATION/TRAINING PROGRAM
Payer: COMMERCIAL

## 2023-09-22 ENCOUNTER — ANESTHESIA EVENT (OUTPATIENT)
Dept: CARDIOLOGY | Facility: HOSPITAL | Age: 63
End: 2023-09-22
Payer: COMMERCIAL

## 2023-09-22 VITALS
SYSTOLIC BLOOD PRESSURE: 116 MMHG | DIASTOLIC BLOOD PRESSURE: 80 MMHG | BODY MASS INDEX: 29.34 KG/M2 | WEIGHT: 209.6 LBS | RESPIRATION RATE: 16 BRPM | TEMPERATURE: 97.4 F | HEIGHT: 71 IN | OXYGEN SATURATION: 94 % | HEART RATE: 78 BPM

## 2023-09-22 DIAGNOSIS — I48.19 ATRIAL FIBRILLATION, PERSISTENT: ICD-10-CM

## 2023-09-22 LAB
ACT BLD: 281 SECONDS (ref 82–152)
ACT BLD: 317 SECONDS (ref 82–152)
ACT BLD: 329 SECONDS (ref 82–152)
ACT BLD: 329 SECONDS (ref 82–152)
ACT BLD: 341 SECONDS (ref 82–152)
ACT BLD: 348 SECONDS (ref 82–152)
GLUCOSE BLDC GLUCOMTR-MCNC: 93 MG/DL (ref 70–130)

## 2023-09-22 PROCEDURE — 93623 PRGRMD STIMJ&PACG IV RX NFS: CPT | Performed by: STUDENT IN AN ORGANIZED HEALTH CARE EDUCATION/TRAINING PROGRAM

## 2023-09-22 PROCEDURE — 93657 TX L/R ATRIAL FIB ADDL: CPT | Performed by: STUDENT IN AN ORGANIZED HEALTH CARE EDUCATION/TRAINING PROGRAM

## 2023-09-22 PROCEDURE — 25010000002 PROTAMINE SULFATE PER 10 MG: Performed by: STUDENT IN AN ORGANIZED HEALTH CARE EDUCATION/TRAINING PROGRAM

## 2023-09-22 PROCEDURE — 85347 COAGULATION TIME ACTIVATED: CPT

## 2023-09-22 PROCEDURE — 25010000002 PROPOFOL 10 MG/ML EMULSION: Performed by: NURSE ANESTHETIST, CERTIFIED REGISTERED

## 2023-09-22 PROCEDURE — 93656 COMPRE EP EVAL ABLTJ ATR FIB: CPT | Performed by: STUDENT IN AN ORGANIZED HEALTH CARE EDUCATION/TRAINING PROGRAM

## 2023-09-22 PROCEDURE — 25010000002 ADENOSINE PER 6 MG: Performed by: STUDENT IN AN ORGANIZED HEALTH CARE EDUCATION/TRAINING PROGRAM

## 2023-09-22 PROCEDURE — C1894 INTRO/SHEATH, NON-LASER: HCPCS | Performed by: STUDENT IN AN ORGANIZED HEALTH CARE EDUCATION/TRAINING PROGRAM

## 2023-09-22 PROCEDURE — 0 LIDOCAINE 1 % SOLUTION: Performed by: NURSE ANESTHETIST, CERTIFIED REGISTERED

## 2023-09-22 PROCEDURE — 25010000002 PHENYLEPHRINE 10 MG/ML SOLUTION 1 ML VIAL: Performed by: NURSE ANESTHETIST, CERTIFIED REGISTERED

## 2023-09-22 PROCEDURE — 25010000002 ONDANSETRON PER 1 MG: Performed by: NURSE ANESTHETIST, CERTIFIED REGISTERED

## 2023-09-22 PROCEDURE — C1766 INTRO/SHEATH,STRBLE,NON-PEEL: HCPCS | Performed by: STUDENT IN AN ORGANIZED HEALTH CARE EDUCATION/TRAINING PROGRAM

## 2023-09-22 PROCEDURE — C1760 CLOSURE DEV, VASC: HCPCS | Performed by: STUDENT IN AN ORGANIZED HEALTH CARE EDUCATION/TRAINING PROGRAM

## 2023-09-22 PROCEDURE — 25010000002 DEXAMETHASONE PER 1 MG: Performed by: NURSE ANESTHETIST, CERTIFIED REGISTERED

## 2023-09-22 PROCEDURE — C1732 CATH, EP, DIAG/ABL, 3D/VECT: HCPCS | Performed by: STUDENT IN AN ORGANIZED HEALTH CARE EDUCATION/TRAINING PROGRAM

## 2023-09-22 PROCEDURE — 25010000002 FUROSEMIDE PER 20 MG: Performed by: NURSE ANESTHETIST, CERTIFIED REGISTERED

## 2023-09-22 PROCEDURE — 25010000002 ESMOLOL 100 MG/10ML SOLUTION: Performed by: NURSE ANESTHETIST, CERTIFIED REGISTERED

## 2023-09-22 PROCEDURE — 93655 ICAR CATH ABLTJ DSCRT ARRHYT: CPT | Performed by: STUDENT IN AN ORGANIZED HEALTH CARE EDUCATION/TRAINING PROGRAM

## 2023-09-22 PROCEDURE — C1893 INTRO/SHEATH, FIXED,NON-PEEL: HCPCS | Performed by: STUDENT IN AN ORGANIZED HEALTH CARE EDUCATION/TRAINING PROGRAM

## 2023-09-22 PROCEDURE — C1759 CATH, INTRA ECHOCARDIOGRAPHY: HCPCS | Performed by: STUDENT IN AN ORGANIZED HEALTH CARE EDUCATION/TRAINING PROGRAM

## 2023-09-22 PROCEDURE — 82948 REAGENT STRIP/BLOOD GLUCOSE: CPT

## 2023-09-22 PROCEDURE — 25010000002 HEPARIN (PORCINE) PER 1000 UNITS: Performed by: STUDENT IN AN ORGANIZED HEALTH CARE EDUCATION/TRAINING PROGRAM

## 2023-09-22 PROCEDURE — 25010000002 SUGAMMADEX 200 MG/2ML SOLUTION: Performed by: NURSE ANESTHETIST, CERTIFIED REGISTERED

## 2023-09-22 PROCEDURE — C1730 CATH, EP, 19 OR FEW ELECT: HCPCS | Performed by: STUDENT IN AN ORGANIZED HEALTH CARE EDUCATION/TRAINING PROGRAM

## 2023-09-22 RX ORDER — LIDOCAINE HYDROCHLORIDE 10 MG/ML
0.5 INJECTION, SOLUTION EPIDURAL; INFILTRATION; INTRACAUDAL; PERINEURAL ONCE AS NEEDED
Status: DISCONTINUED | OUTPATIENT
Start: 2023-09-22 | End: 2023-09-22 | Stop reason: HOSPADM

## 2023-09-22 RX ORDER — ACETAMINOPHEN 650 MG/1
650 SUPPOSITORY RECTAL EVERY 4 HOURS PRN
Status: DISCONTINUED | OUTPATIENT
Start: 2023-09-22 | End: 2023-09-22 | Stop reason: HOSPADM

## 2023-09-22 RX ORDER — FUROSEMIDE 10 MG/ML
INJECTION INTRAMUSCULAR; INTRAVENOUS AS NEEDED
Status: DISCONTINUED | OUTPATIENT
Start: 2023-09-22 | End: 2023-09-22 | Stop reason: SURG

## 2023-09-22 RX ORDER — DEXAMETHASONE SODIUM PHOSPHATE 4 MG/ML
INJECTION, SOLUTION INTRA-ARTICULAR; INTRALESIONAL; INTRAMUSCULAR; INTRAVENOUS; SOFT TISSUE AS NEEDED
Status: DISCONTINUED | OUTPATIENT
Start: 2023-09-22 | End: 2023-09-22 | Stop reason: SURG

## 2023-09-22 RX ORDER — HEPARIN SODIUM 1000 [USP'U]/ML
INJECTION, SOLUTION INTRAVENOUS; SUBCUTANEOUS
Status: DISCONTINUED | OUTPATIENT
Start: 2023-09-22 | End: 2023-09-22 | Stop reason: HOSPADM

## 2023-09-22 RX ORDER — SODIUM CHLORIDE 9 MG/ML
40 INJECTION, SOLUTION INTRAVENOUS AS NEEDED
Status: DISCONTINUED | OUTPATIENT
Start: 2023-09-22 | End: 2023-09-22 | Stop reason: HOSPADM

## 2023-09-22 RX ORDER — SODIUM CHLORIDE, SODIUM LACTATE, POTASSIUM CHLORIDE, CALCIUM CHLORIDE 600; 310; 30; 20 MG/100ML; MG/100ML; MG/100ML; MG/100ML
9 INJECTION, SOLUTION INTRAVENOUS CONTINUOUS
Status: DISCONTINUED | OUTPATIENT
Start: 2023-09-22 | End: 2023-09-22 | Stop reason: HOSPADM

## 2023-09-22 RX ORDER — FAMOTIDINE 20 MG/1
20 TABLET, FILM COATED ORAL ONCE
Status: DISCONTINUED | OUTPATIENT
Start: 2023-09-22 | End: 2023-09-22 | Stop reason: HOSPADM

## 2023-09-22 RX ORDER — SODIUM CHLORIDE 0.9 % (FLUSH) 0.9 %
10 SYRINGE (ML) INJECTION AS NEEDED
Status: DISCONTINUED | OUTPATIENT
Start: 2023-09-22 | End: 2023-09-22 | Stop reason: HOSPADM

## 2023-09-22 RX ORDER — ACETAMINOPHEN 325 MG/1
650 TABLET ORAL EVERY 4 HOURS PRN
Status: DISCONTINUED | OUTPATIENT
Start: 2023-09-22 | End: 2023-09-22 | Stop reason: HOSPADM

## 2023-09-22 RX ORDER — SODIUM CHLORIDE 9 MG/ML
INJECTION, SOLUTION INTRAVENOUS CONTINUOUS PRN
Status: DISCONTINUED | OUTPATIENT
Start: 2023-09-22 | End: 2023-09-22 | Stop reason: SURG

## 2023-09-22 RX ORDER — ROCURONIUM BROMIDE 10 MG/ML
INJECTION, SOLUTION INTRAVENOUS AS NEEDED
Status: DISCONTINUED | OUTPATIENT
Start: 2023-09-22 | End: 2023-09-22 | Stop reason: SURG

## 2023-09-22 RX ORDER — HYDROMORPHONE HYDROCHLORIDE 1 MG/ML
0.5 INJECTION, SOLUTION INTRAMUSCULAR; INTRAVENOUS; SUBCUTANEOUS
Status: DISCONTINUED | OUTPATIENT
Start: 2023-09-22 | End: 2023-09-22 | Stop reason: HOSPADM

## 2023-09-22 RX ORDER — SODIUM CHLORIDE 0.9 % (FLUSH) 0.9 %
3 SYRINGE (ML) INJECTION EVERY 12 HOURS SCHEDULED
Status: DISCONTINUED | OUTPATIENT
Start: 2023-09-22 | End: 2023-09-22 | Stop reason: HOSPADM

## 2023-09-22 RX ORDER — ONDANSETRON 2 MG/ML
INJECTION INTRAMUSCULAR; INTRAVENOUS AS NEEDED
Status: DISCONTINUED | OUTPATIENT
Start: 2023-09-22 | End: 2023-09-22 | Stop reason: SURG

## 2023-09-22 RX ORDER — FAMOTIDINE 10 MG/ML
20 INJECTION, SOLUTION INTRAVENOUS ONCE
Status: COMPLETED | OUTPATIENT
Start: 2023-09-22 | End: 2023-09-22

## 2023-09-22 RX ORDER — ESMOLOL HYDROCHLORIDE 10 MG/ML
INJECTION INTRAVENOUS AS NEEDED
Status: DISCONTINUED | OUTPATIENT
Start: 2023-09-22 | End: 2023-09-22 | Stop reason: SURG

## 2023-09-22 RX ORDER — SODIUM CHLORIDE 9 MG/ML
INJECTION, SOLUTION INTRAVENOUS
Status: DISCONTINUED | OUTPATIENT
Start: 2023-09-22 | End: 2023-09-22 | Stop reason: HOSPADM

## 2023-09-22 RX ORDER — SODIUM CHLORIDE 0.9 % (FLUSH) 0.9 %
10 SYRINGE (ML) INJECTION EVERY 12 HOURS SCHEDULED
Status: DISCONTINUED | OUTPATIENT
Start: 2023-09-22 | End: 2023-09-22 | Stop reason: HOSPADM

## 2023-09-22 RX ORDER — MIDAZOLAM HYDROCHLORIDE 1 MG/ML
1 INJECTION INTRAMUSCULAR; INTRAVENOUS
Status: DISCONTINUED | OUTPATIENT
Start: 2023-09-22 | End: 2023-09-22 | Stop reason: HOSPADM

## 2023-09-22 RX ORDER — HEPARIN SODIUM 10000 [USP'U]/100ML
INJECTION, SOLUTION INTRAVENOUS
Status: COMPLETED | OUTPATIENT
Start: 2023-09-22 | End: 2023-09-22

## 2023-09-22 RX ORDER — NITROGLYCERIN 0.4 MG/1
0.4 TABLET SUBLINGUAL
Status: DISCONTINUED | OUTPATIENT
Start: 2023-09-22 | End: 2023-09-22 | Stop reason: HOSPADM

## 2023-09-22 RX ORDER — LIDOCAINE HYDROCHLORIDE 10 MG/ML
INJECTION, SOLUTION INFILTRATION; PERINEURAL AS NEEDED
Status: DISCONTINUED | OUTPATIENT
Start: 2023-09-22 | End: 2023-09-22 | Stop reason: SURG

## 2023-09-22 RX ORDER — PROPOFOL 10 MG/ML
VIAL (ML) INTRAVENOUS AS NEEDED
Status: DISCONTINUED | OUTPATIENT
Start: 2023-09-22 | End: 2023-09-22 | Stop reason: SURG

## 2023-09-22 RX ORDER — PROTAMINE SULFATE 10 MG/ML
INJECTION, SOLUTION INTRAVENOUS
Status: DISCONTINUED | OUTPATIENT
Start: 2023-09-22 | End: 2023-09-22 | Stop reason: HOSPADM

## 2023-09-22 RX ORDER — ONDANSETRON 2 MG/ML
4 INJECTION INTRAMUSCULAR; INTRAVENOUS EVERY 6 HOURS PRN
Status: DISCONTINUED | OUTPATIENT
Start: 2023-09-22 | End: 2023-09-22 | Stop reason: HOSPADM

## 2023-09-22 RX ORDER — ADENOSINE 3 MG/ML
INJECTION, SOLUTION INTRAVENOUS
Status: DISCONTINUED | OUTPATIENT
Start: 2023-09-22 | End: 2023-09-22 | Stop reason: HOSPADM

## 2023-09-22 RX ORDER — FENTANYL CITRATE 50 UG/ML
50 INJECTION, SOLUTION INTRAMUSCULAR; INTRAVENOUS
Status: DISCONTINUED | OUTPATIENT
Start: 2023-09-22 | End: 2023-09-22 | Stop reason: HOSPADM

## 2023-09-22 RX ORDER — IBUPROFEN 200 MG
400 TABLET ORAL EVERY 6 HOURS PRN
Status: DISCONTINUED | OUTPATIENT
Start: 2023-09-22 | End: 2023-09-22 | Stop reason: HOSPADM

## 2023-09-22 RX ADMIN — PROPOFOL 200 MG: 10 INJECTION, EMULSION INTRAVENOUS at 08:20

## 2023-09-22 RX ADMIN — DEXAMETHASONE SODIUM PHOSPHATE 4 MG: 4 INJECTION, SOLUTION INTRAMUSCULAR; INTRAVENOUS at 08:23

## 2023-09-22 RX ADMIN — ROCURONIUM BROMIDE 20 MG: 10 SOLUTION INTRAVENOUS at 09:32

## 2023-09-22 RX ADMIN — LIDOCAINE HYDROCHLORIDE 40 MG: 10 INJECTION, SOLUTION INFILTRATION; PERINEURAL at 08:20

## 2023-09-22 RX ADMIN — FAMOTIDINE 20 MG: 10 INJECTION INTRAVENOUS at 07:14

## 2023-09-22 RX ADMIN — SODIUM CHLORIDE: 9 INJECTION, SOLUTION INTRAVENOUS at 08:04

## 2023-09-22 RX ADMIN — SUGAMMADEX 200 MG: 100 INJECTION, SOLUTION INTRAVENOUS at 12:18

## 2023-09-22 RX ADMIN — ROCURONIUM BROMIDE 20 MG: 10 SOLUTION INTRAVENOUS at 10:40

## 2023-09-22 RX ADMIN — ROCURONIUM BROMIDE 10 MG: 10 SOLUTION INTRAVENOUS at 09:18

## 2023-09-22 RX ADMIN — ESMOLOL HYDROCHLORIDE 40 MG: 10 INJECTION, SOLUTION INTRAVENOUS at 08:30

## 2023-09-22 RX ADMIN — FUROSEMIDE 20 MG: 10 INJECTION, SOLUTION INTRAMUSCULAR; INTRAVENOUS at 12:19

## 2023-09-22 RX ADMIN — ROCURONIUM BROMIDE 50 MG: 10 SOLUTION INTRAVENOUS at 08:20

## 2023-09-22 RX ADMIN — ESMOLOL HYDROCHLORIDE 20 MG: 10 INJECTION, SOLUTION INTRAVENOUS at 08:20

## 2023-09-22 RX ADMIN — ONDANSETRON 4 MG: 2 INJECTION INTRAMUSCULAR; INTRAVENOUS at 12:18

## 2023-09-22 RX ADMIN — ROCURONIUM BROMIDE 20 MG: 10 SOLUTION INTRAVENOUS at 11:41

## 2023-09-22 RX ADMIN — PHENYLEPHRINE HYDROCHLORIDE 0.4 MCG/KG/MIN: 10 INJECTION INTRAVENOUS at 08:24

## 2023-09-22 NOTE — ANESTHESIA PROCEDURE NOTES
Airway  Urgency: elective    Date/Time: 9/22/2023 8:49 AM  Airway not difficult    General Information and Staff    Patient location during procedure: OR    Indications and Patient Condition  Indications for airway management: airway protection    Preoxygenated: yes  MILS not maintained throughout  Mask difficulty assessment: 1 - vent by mask    Final Airway Details  Final airway type: endotracheal airway      Successful airway: ETT  Cuffed: yes   Successful intubation technique: direct laryngoscopy  Endotracheal tube insertion site: oral  Blade: Kelley  Blade size: 4  ETT size (mm): 7.5  Cormack-Lehane Classification: grade I - full view of glottis  Placement verified by: chest auscultation and capnometry   Measured from: lips  ETT/EBT  to lips (cm): 22  Number of attempts at approach: 1  Assessment: lips, teeth, and gum same as pre-op and atraumatic intubation    Additional Comments  Negative epigastric sounds, Breath sound equal bilaterally with symmetric chest rise and fall

## 2023-09-22 NOTE — H&P
[facility]  Cardiovascular History and Physical Note         Patient is a 63-year-old gentleman with a history of dilated cardiomyopathy, persistent atrial fibrillation, and mild mitral regurgitation who presents today to undergo pulmonary vein ablation.  The patient follows Dr. Camejo for his mild mitral regurgitation and dilated cardiomyopathy.  He had a reduced LVEF of 40% 2019 but most recent echocardiogram last year showed normalization of his LVEF.  The patient has failed sotalol therapy with frequent breakthrough episodes of his atrial fibrillation.  He is in A-fib today.  He reports significant fatigue interfering with his ability to perform activities.  He is on Xarelto for stroke prophylaxis but held yesterday's dose in preparation for today's procedure...    Past medical and surgical history, social and family history reviewed in EMR.    REVIEW OF SYSTEMS:   H&P ROS reviewed and pertinent CV ROS as noted in HPI.         Vital Sign Min/Max for last 24 hours  Temp  Min: 96.8 °F (36 °C)  Max: 96.8 °F (36 °C)   BP  Min: 133/91  Max: 133/91   Pulse  Min: 107  Max: 107   Resp  Min: 18  Max: 18   SpO2  Min: 96 %  Max: 96 %   No data recorded    No intake or output data in the 24 hours ending 09/22/23 0704        Constitutional:       General: Awake.      Appearance: Healthy appearance.   Pulmonary:      Effort: Pulmonary effort is normal.      Breath sounds: Normal breath sounds.   Cardiovascular:      Normal rate. Regular rhythm.      Murmurs: There is a systolic murmur.   Pulses:     Intact distal pulses.   Edema:     Peripheral edema absent.   Skin:     General: Skin is warm and dry.   Neurological:      Mental Status: Alert and oriented to person, place and time.   Psychiatric:         Behavior: Behavior is cooperative.            Lab Review:   Labs reviewed in the electronic medical record.  Pertinent findings include:  Lab Results   Component Value Date    GLUCOSE 99  09/20/2023    BUN 8 09/20/2023    CREATININE 0.83 09/20/2023    EGFRRESULT 90 09/08/2022    EGFR 98.3 09/20/2023    BCR 9.6 09/20/2023    K 4.0 09/20/2023    CO2 28.0 09/20/2023    CALCIUM 9.2 09/20/2023    PROTENTOTREF 7.1 09/08/2022    ALBUMIN 4.3 09/08/2022    BILITOT 0.3 09/08/2022    AST 17 09/08/2022    ALT 16 09/08/2022     Lab Results   Component Value Date    WBC 7.12 09/20/2023    HGB 14.9 09/20/2023    HCT 46.3 09/20/2023    MCV 91.1 09/20/2023     09/20/2023     No results found for: CHOL, CHLPL, TRIG, HDL, LDL, LDLDIRECT            Active Hospital Problems    Diagnosis     **Persistent atrial fibrillation      [id number]=4-1 (prior cardiomyopathy)  GISSELL 08/20/2019: EF 40%, severe LA enlargement, no MILTON clot. Successful ECV  Successful ECV 11/20/2019, Sotalol 80 mg BID  Echo 02/10/2020: EF 55-60%  Recurrent Afib in the setting on COVID-19 infection, Sotalol increased to 160 mg BID  Echo 09/06/2022: EF 62%, diastolic dysfunction, LV mass index increased, LA volume mildly increased, RA mildly dilated, moderate bileaflet mitral valve thickening, mild MR, moderate TR, RVSP 45-55 mmHg, mild aortic sclerosis      Cardiomyopathy, dilated      GISSELL 08/20/2019: EF 40%  Echo 02/10/2020: EF 55-60%  Echo 09/06/2022: EF 62%, diastolic dysfunction, LV mass index increased, LA volume mildly increased, RA mildly dilated, moderate bileaflet mitral valve thickening, mild MR, moderate TR, RVSP 45-55 mmHg, mild aortic sclerosis      Obstructive sleep apnea on CPAP       Patient presents today to undergo pulmonary vein ablation for his persistent atrial fibrillation.  The risks and benefits of the procedure were discussed and the patient is agreeable to proceed       Proceed with pulmonary vein ablation with cardioversion to be performed by Dr. Frank Chi  Further recommendations to follow      SITA Medrano

## 2023-09-22 NOTE — ANESTHESIA PREPROCEDURE EVALUATION
Anesthesia Evaluation     Patient summary reviewed and Nursing notes reviewed                Airway   Mallampati: II  TM distance: >3 FB  Neck ROM: full  No difficulty expected  Dental - normal exam     Pulmonary - normal exam   (+) ,sleep apnea on CPAP  Cardiovascular - normal exam    (+) valvular problems/murmurs, dysrhythmias Atrial Fib    ROS comment: DILATED CARDIOMYOPATHY  ECHO 9/22  ·Estimated left ventricular EF = 62% Estimated left ventricular EF was in agreement with the calculated left ventricular EF. Left ventricular ejection fraction appears to be 61 - 65%. Left ventricular systolic function is normal.  ·Left ventricular diastolic function is consistent with (grade II w/high LAP) pseudonormalization.  ·Left ventricular mass index is increased.  ·Left atrial volume is mildly increased.  ·The right atrial cavity is mildly dilated.  ·There is moderate, bileaflet mitral valve thickening present.  ·Mild mitral valve regurgitation is present.  ·Moderate tricuspid valve regurgitation is present.  ·Estimated right ventricular systolic pressure from tricuspid regurgitation is moderately elevated (45-55 mmHg).  ·The right ventricular cavity is mildly dilated.  ·Mild aortic sclerosis  ·Moderate TR, Mild MR.  ·No pericardial effusion      Neuro/Psych- negative ROS  GI/Hepatic/Renal/Endo - negative ROS     Musculoskeletal (-) negative ROS    Abdominal  - normal exam    Bowel sounds: normal.   Substance History - negative use     OB/GYN negative ob/gyn ROS         Other                        Anesthesia Plan    ASA 3     general     intravenous induction     Anesthetic plan, risks, benefits, and alternatives have been provided, discussed and informed consent has been obtained with: patient.    Plan discussed with CRNA.    CODE STATUS:

## 2023-09-22 NOTE — Clinical Note
Hemostasis started on the right femoral vein. Vascade MVP and purse string suturing was used in achieving hemostasis. Closure device deployed in the vessel. Hemostasis achieved successfully.

## 2023-09-22 NOTE — ANESTHESIA POSTPROCEDURE EVALUATION
Patient: Reed Clarke Jr.    Procedure Summary       Date: 09/22/23 Room / Location: [facility] CATH/EP LAB G / [facility] INVASIVE LOCATION    Anesthesia Start: 0804 Anesthesia Stop: 1240    Procedure: PVA, paroxysmal, hold Sotalol 4 days and Eliquis 1 day Diagnosis:       Atrial fibrillation, persistent      (AF)    Providers: Frank Chi MD Provider: Abrahan Bradley MD    Anesthesia Type: general ASA Status: 3            Anesthesia Type: general    Vitals  Vitals Value Taken Time   BP 97/70 09/22/23 1238   Temp     Pulse 64 09/22/23 1240   Resp     SpO2     Vitals shown include unvalidated device data.        Post Anesthesia Care and Evaluation    Patient location during evaluation: PACU  Patient participation: complete - patient participated  Level of consciousness: awake and alert  Pain management: adequate    Airway patency: patent  Anesthetic complications: No anesthetic complications  PONV Status: none  Cardiovascular status: hemodynamically stable and acceptable  Respiratory status: nonlabored ventilation, acceptable and nasal cannula  Hydration status: acceptable

## 2023-09-25 ENCOUNTER — CALL CENTER PROGRAMS (OUTPATIENT)
Dept: CALL CENTER | Facility: HOSPITAL | Age: 63
End: 2023-09-25

## 2023-09-25 NOTE — OUTREACH NOTE
PCI/Device Survey      Flowsheet Row Responses   Facility patient discharged from? [facility]   Procedure date 09/22/23   Procedure (if device, specify in description) Ablation  [bilat groins]   Performing MD Dr. Frank Chi   Attempt successful? Yes   Call start time 1130   Call end time 1138   Has the patient had any of the following symptoms since discharge? Dizziness or lightheadedness   Nursing interventions Patient education provided   Symptom comments Pt reports that [place] had some lightheadedness that resolved upon rest. He will contact MD if it continues.   Is the patient taking prescribed medications: --  [Xarelto was resumed 9-22]   Nursing intervention Reminded to continue to take prescribed medications   Does the patient have any of the following symptoms related to the cath/surgical site? --  [bilat groin sites are without issue per pt]   Nursing intervention Patient education provided   Does the patient have an appointment scheduled with the cardiologist? Yes   If the patient is a current smoker, are they able to teach back resources for cessation? --  [pt chews dip]   Did the patient feel prepared to go home on the same day as the procedure? Yes   Is the patient satisfied with the same day discharge process? Yes   PCI/Device call completed Yes            Noreen RODRIGUEZ - Registered Nurse

## 2023-09-26 ENCOUNTER — TELEPHONE (OUTPATIENT)
Dept: CARDIOLOGY | Facility: CLINIC | Age: 63
End: 2023-09-26
Payer: COMMERCIAL

## 2023-09-26 DIAGNOSIS — R91.1 LUNG NODULE: Primary | ICD-10-CM

## 2023-09-26 NOTE — TELEPHONE ENCOUNTER
I tried to call the patient but he did not answer. I left a message for him to call me back at the office.      Order placed for chest CT.

## 2023-09-26 NOTE — TELEPHONE ENCOUNTER
----- Message from Frank Chi MD sent at 9/22/2023  4:11 PM EDT -----  Can we set him up for a 3-month follow-up chest CT  ----- Message -----  From: Domo [facility] [place]  Sent: 9/21/2023   5:54 PM EDT  To: Frank Chi MD

## 2023-09-27 ENCOUNTER — OFFICE VISIT (OUTPATIENT)
Dept: CARDIOLOGY | Facility: CLINIC | Age: 63
End: 2023-09-27
Payer: COMMERCIAL

## 2023-09-27 VITALS
WEIGHT: 208 LBS | RESPIRATION RATE: 12 BRPM | TEMPERATURE: 96 F | SYSTOLIC BLOOD PRESSURE: 128 MMHG | HEART RATE: 81 BPM | BODY MASS INDEX: 29.12 KG/M2 | HEIGHT: 71 IN | DIASTOLIC BLOOD PRESSURE: 74 MMHG | OXYGEN SATURATION: 99 %

## 2023-09-27 DIAGNOSIS — I48.19 PERSISTENT ATRIAL FIBRILLATION: ICD-10-CM

## 2023-09-27 DIAGNOSIS — I42.0 CARDIOMYOPATHY, DILATED: Primary | ICD-10-CM

## 2023-09-27 DIAGNOSIS — G47.33 OBSTRUCTIVE SLEEP APNEA ON CPAP: ICD-10-CM

## 2023-09-27 PROCEDURE — 93000 ELECTROCARDIOGRAM COMPLETE: CPT | Performed by: INTERNAL MEDICINE

## 2023-09-27 PROCEDURE — 99214 OFFICE O/P EST MOD 30 MIN: CPT | Performed by: INTERNAL MEDICINE

## 2023-09-27 NOTE — PROGRESS NOTES
[facility]  [facility]  [address] [place] [address]  Dept: [phone]  Dept Fax: [phone]    Reed Clarke Jr.  1960    Follow Up Office Visit Note    History of Present Illness:  Reed Clarke Jr. is a 63 y.o. male who presents to the clinic for Follow-up.Paroxysmal atrial flutter- Fibrillation, He underwent ablation of flutter and isolation of the pulmonary veins, has had some fast heart rate, nut mo complaints, EKG sinus, on Xarelto  20 mg, off Sotalol    The following portions of the patient's history were reviewed and updated as appropriate: allergies, current medications, past family history, past medical history, past social history, past surgical history, and problem list.    Medications:  acetaminophen  Xarelto tablet    Subjective  No Known Allergies     Past Medical History:   Diagnosis Date    BMI 27.0-27.9,adult     Mild sleep apnea     cpap compliant, PS 8    Paroxysmal atrial fibrillation     Secondary cardiomyopathy        Past Surgical History:   Procedure Laterality Date    CARDIAC ELECTROPHYSIOLOGY PROCEDURE N/A 9/22/2023    Procedure: PVA, paroxysmal, hold Sotalol 4 days and Eliquis 1 day;  Surgeon: Frank Chi MD;  Location: [facility] INVASIVE LOCATION;  Service: Cardiovascular;  Laterality: N/A;    OTHER SURGICAL HISTORY      PVA 09/22/2023 PER DR. CHI    TONSILLECTOMY      TRANSESOPHAGEAL ECHOCARDIOGRAM (GISSELL) AND CARDIOVERSION      x2    WISDOM TOOTH EXTRACTION         Family History   Problem Relation Age of Onset    Prostate cancer Father     Diabetes Other         Social History     Socioeconomic History    Marital status:    Tobacco Use    Smoking status: Never    Smokeless tobacco: Current     Types: Chew   Vaping Use    Vaping Use: Never used   Substance and Sexual Activity    Alcohol use: Yes     Comment: once or twice a month    Drug use: Never    Sexual activity: Defer       Review of Systems   Constitutional:  "Negative.    HENT: Negative.     Respiratory: Negative.     Cardiovascular: Negative.    Endocrine: Negative.    Genitourinary: Negative.    Musculoskeletal: Negative.    Skin: Negative.    Allergic/Immunologic: Negative.    Neurological: Negative.    Hematological: Negative.    Psychiatric/Behavioral: Negative.       Cardiovascular Procedures    ECHO/MUGA:  STRESS TESTS:   CARDIAC CATH:   DEVICES:   HOLTER:   CT/MRI:   VASCULAR:   CARDIOTHORACIC:     Objective  Vitals:    09/27/23 1022   BP: 128/74   BP Location: Right arm   Patient Position: Lying   Cuff Size: Adult   Pulse: 81   Resp: 12   Temp: 96 °F (35.6 °C)   TempSrc: Infrared   SpO2: 99%   Weight: 94.3 kg (208 lb)   Height: 180.3 cm (71\")   PainSc: 0-No pain     Body mass index is 29.01 kg/m².     Physical Exam  Vitals reviewed.   Constitutional:       Appearance: Healthy appearance. Not in distress.   Eyes:      Pupils: Pupils are equal, round, and reactive to light.   HENT:    Mouth/Throat:      Pharynx: Oropharynx is clear.   Neck:      Thyroid: Thyroid normal.      Vascular: No JVR. JVD normal.   Pulmonary:      Effort: Pulmonary effort is normal.      Breath sounds: Normal breath sounds. No wheezing. No rhonchi. No rales.   Chest:      Chest wall: Not tender to palpatation.   Cardiovascular:      PMI at left midclavicular line. Normal rate. Regular rhythm. Normal S1. Normal S2.       Murmurs: There is no murmur.      No gallop.  No click. No rub.   Pulses:     Intact distal pulses.      Carotid: 3+ bilaterally.     Radial: 3+ bilaterally.     Femoral: 3+ bilaterally.     Dorsalis pedis: 3+ bilaterally.     Posterior tibial: 3+ bilaterally.  Edema:     Peripheral edema absent.   Abdominal:      General: Bowel sounds are normal.      Palpations: Abdomen is soft.      Tenderness: There is no abdominal tenderness.   Musculoskeletal: Normal range of motion.         General: No tenderness.      Cervical back: Normal range of motion and neck supple. Skin:     " General: Skin is warm and dry.   Neurological:      General: No focal deficit present.      Mental Status: Alert and oriented to person, place and time.        Diagnostic Data    ECG 12 Lead    Date/Time: 9/27/2023 10:45 AM  Performed by: Andrea Hernández MD  Authorized by: Andrea Hernández MD   Comparison: compared with previous ECG from 8/23/2023  Similar to previous ECG  Rhythm: sinus rhythm  Rate: normal  BPM: 78  QRS axis: normal    Clinical impression: normal ECG        Assessment and Plan  Diagnoses and all orders for this visit:    Cardiomyopathy, dilated-Likely secondary to atrial fibrillation, with recovery of the Ef, no complaints    Persistent atrial fibrillation- Ablation for flutter . Seems doing good, some fast rate for short times, will expect to decreased with the days    Obstructive sleep apnea on CPAP         Return in about 3 months (around 12/27/2023) for Recheck with Dr. Hernández.    Andrea Hernández MD  09/27/2023

## 2023-12-11 RX ORDER — RIVAROXABAN 20 MG/1
TABLET, FILM COATED ORAL
Qty: 90 TABLET | Refills: 1 | Status: SHIPPED | OUTPATIENT
Start: 2023-12-11

## 2023-12-27 ENCOUNTER — OFFICE VISIT (OUTPATIENT)
Dept: CARDIOLOGY | Facility: CLINIC | Age: 63
End: 2023-12-27
Payer: COMMERCIAL

## 2023-12-27 ENCOUNTER — TELEPHONE (OUTPATIENT)
Dept: CARDIOLOGY | Facility: CLINIC | Age: 63
End: 2023-12-27

## 2023-12-27 VITALS
BODY MASS INDEX: 30.1 KG/M2 | DIASTOLIC BLOOD PRESSURE: 74 MMHG | RESPIRATION RATE: 18 BRPM | HEIGHT: 71 IN | HEART RATE: 64 BPM | OXYGEN SATURATION: 99 % | TEMPERATURE: 98 F | SYSTOLIC BLOOD PRESSURE: 130 MMHG | WEIGHT: 215 LBS

## 2023-12-27 DIAGNOSIS — G47.33 OBSTRUCTIVE SLEEP APNEA ON CPAP: ICD-10-CM

## 2023-12-27 DIAGNOSIS — I48.19 PERSISTENT ATRIAL FIBRILLATION: Primary | ICD-10-CM

## 2023-12-27 DIAGNOSIS — Z86.79 HISTORY OF CARDIOMYOPATHY: ICD-10-CM

## 2023-12-27 PROBLEM — I27.20 PULMONARY HTN: Status: RESOLVED | Noted: 2023-09-21 | Resolved: 2023-12-27

## 2023-12-27 PROBLEM — I38 HEART VALVE DISEASE: Status: RESOLVED | Noted: 2021-08-31 | Resolved: 2023-12-27

## 2023-12-27 PROBLEM — I42.0 CARDIOMYOPATHY, DILATED: Status: RESOLVED | Noted: 2021-02-22 | Resolved: 2023-12-27

## 2023-12-27 PROCEDURE — 99214 OFFICE O/P EST MOD 30 MIN: CPT | Performed by: INTERNAL MEDICINE

## 2023-12-27 PROCEDURE — 93000 ELECTROCARDIOGRAM COMPLETE: CPT | Performed by: INTERNAL MEDICINE

## 2023-12-27 RX ORDER — METOPROLOL SUCCINATE 50 MG/1
50 TABLET, EXTENDED RELEASE ORAL DAILY
Qty: 30 TABLET | Refills: 11 | Status: SHIPPED | OUTPATIENT
Start: 2023-12-27 | End: 2023-12-27 | Stop reason: DRUGHIGH

## 2023-12-27 NOTE — TELEPHONE ENCOUNTER
Spoke with Mr. Clarke and advised him that they can do the cardioversion this Friday at noon.  You would [place] [facility] [place] 10[place] front [place].  Nothing to eat/drink after MN and you will need a .  He confirmed and I have let [facility] [place] it a go and faxed all paperwork to them.

## 2023-12-27 NOTE — PROGRESS NOTES
[facility]  [facility]IOLOG[facility]  [address] [place] [address]  Dept: [phone]  Dept Fax: [phone]    Reed Clarke Jr.  1960    Follow Up Office Visit Note    History of Present Illness:  Reed Clarke Jr. is a 63 y.o. male who presents to the clinic for Follow-up.PAF and PAF- He is s/PLAN: ablation pulmonary veins and also right atrial flutter but for the last month has been feeling like he is in a cloud up and down, he is in AF- flutter ,  on xarelto, we discuss with Dr Cih, he prefers go back to Sotalol 80mg bid and also cardioversion and go from there    The following portions of the patient's history were reviewed and updated as appropriate: allergies, current medications, past family history, past medical history, past social history, past surgical history, and problem list.    Medications:  acetaminophen  Sotalol HCl AF tablet  Xarelto tablet    Subjective  No Known Allergies     Past Medical History:   Diagnosis Date    BMI 27.0-27.9,adult     Mild sleep apnea     cpap compliant, PS 8    Paroxysmal atrial fibrillation     Secondary cardiomyopathy        Past Surgical History:   Procedure Laterality Date    CARDIAC ELECTROPHYSIOLOGY PROCEDURE N/A 9/22/2023    Procedure: PVA, paroxysmal, hold Sotalol 4 days and Eliquis 1 day;  Surgeon: Frank Chi MD;  Location: [facility] INVASIVE LOCATION;  Service: Cardiovascular;  Laterality: N/A;    OTHER SURGICAL HISTORY      PVA 09/22/2023 PER DR. CHI    TONSILLECTOMY      TRANSESOPHAGEAL ECHOCARDIOGRAM (GISSELL) AND CARDIOVERSION      x2    WISDOM TOOTH EXTRACTION         Family History   Problem Relation Age of Onset    Prostate cancer Father     Diabetes Other         Social History     Socioeconomic History    Marital status:    Tobacco Use    Smoking status: Never    Smokeless tobacco: Current     Types: Chew   Vaping Use    Vaping Use: Never used   Substance and Sexual Activity    Alcohol use: Yes  "    Comment: once or twice a month    Drug use: Never    Sexual activity: Defer       Review of Systems   Constitutional: Negative.    HENT: Negative.     Respiratory: Negative.     Cardiovascular: Negative.    Endocrine: Negative.    Genitourinary: Negative.    Musculoskeletal: Negative.    Skin: Negative.    Allergic/Immunologic: Negative.    Neurological: Negative.    Hematological: Negative.    Psychiatric/Behavioral: Negative.       Cardiovascular Procedures    ECHO/MUGA:  STRESS TESTS:   CARDIAC CATH:   DEVICES:   HOLTER:   CT/MRI:   VASCULAR:   CARDIOTHORACIC:     Objective  Vitals:    12/27/23 1003   BP: 130/74   BP Location: Right arm   Patient Position: Lying   Cuff Size: Adult   Pulse: 64   Resp: 18   Temp: 98 °F (36.7 °C)   TempSrc: Infrared   SpO2: 99%   Weight: 97.5 kg (215 lb)   Height: 180.3 cm (71\")   PainSc: 0-No pain     Body mass index is 29.99 kg/m².     Physical Exam  Vitals reviewed.   Constitutional:       Appearance: Healthy appearance. Not in distress.   Eyes:      Pupils: Pupils are equal, round, and reactive to light.   HENT:    Mouth/Throat:      Pharynx: Oropharynx is clear.   Neck:      Thyroid: Thyroid normal.      Vascular: No JVR. JVD normal.   Pulmonary:      Effort: Pulmonary effort is normal.      Breath sounds: Normal breath sounds. No wheezing. No rhonchi. No rales.   Chest:      Chest wall: Not tender to palpatation.   Cardiovascular:      PMI at left midclavicular line. Normal rate. Regular rhythm. Normal S1. Normal S2.       Murmurs: There is no murmur.      No gallop.  No click. No rub.   Pulses:     Intact distal pulses.      Carotid: 3+ bilaterally.     Radial: 3+ bilaterally.     Femoral: 3+ bilaterally.     Dorsalis pedis: 3+ bilaterally.     Posterior tibial: 3+ bilaterally.  Edema:     Peripheral edema absent.   Abdominal:      General: Bowel sounds are normal.      Palpations: Abdomen is soft.      Tenderness: There is no abdominal tenderness.   Musculoskeletal: " Normal range of motion.         General: No tenderness.      Cervical back: Normal range of motion and neck supple. Skin:     General: Skin is warm and dry.   Neurological:      General: No focal deficit present.      Mental Status: Alert and oriented to person, place and time.        Diagnostic Data    ECG 12 Lead    Date/Time: 12/27/2023 10:36 AM  Performed by: Andrea Hernández MD    Authorized by: Andrea Hernández MD  Comparison: compared with previous ECG from 9/22/2023  Rhythm: atrial fibrillation  Rate: normal  BPM: 128  QRS axis: normal    Clinical impression: abnormal EKG      Assessment and Plan  Diagnoses and all orders for this visit:    Persistent atrial fibrillation- He is back AF Flutter will start back Sotalol 80 mg bid and also Xarelto 20 mg, will do cardioversion  -     Cardioversion External in Cardiology Department; Future  History of cardiomyopathy with recovery of the EF= last echo normal EF., asymptomatic, likely related to tachycardia induced cardiomyopathy    Obstructive sleep apnea on CPAP    Other orders  -     Discontinue: metoprolol succinate XL (TOPROL-XL) 50 MG 24 hr tablet; Take 1 tablet by mouth Daily.  -     Sotalol HCl AF 80 MG tablet; Take 1 tablet by mouth 2 (Two) Times a Day.         Return for Recheck with Dr. Hernández.    Andrea Hernández MD  12/27/2023

## 2024-01-03 ENCOUNTER — OFFICE VISIT (OUTPATIENT)
Dept: CARDIOLOGY | Facility: CLINIC | Age: 64
End: 2024-01-03
Payer: COMMERCIAL

## 2024-01-03 VITALS
SYSTOLIC BLOOD PRESSURE: 100 MMHG | DIASTOLIC BLOOD PRESSURE: 72 MMHG | BODY MASS INDEX: 30.24 KG/M2 | HEIGHT: 71 IN | HEART RATE: 103 BPM | OXYGEN SATURATION: 96 % | WEIGHT: 216 LBS

## 2024-01-03 DIAGNOSIS — Z86.79 HISTORY OF CARDIOMYOPATHY: Primary | ICD-10-CM

## 2024-01-03 DIAGNOSIS — I48.19 PERSISTENT ATRIAL FIBRILLATION: ICD-10-CM

## 2024-01-03 DIAGNOSIS — G47.33 OBSTRUCTIVE SLEEP APNEA ON CPAP: ICD-10-CM

## 2024-01-03 NOTE — PROGRESS NOTES
[facility] [place]  [facility] CARDIOLOGY  [address] [place] [address]  Dept: [phone]  Dept Fax: [phone]    Reed Clarke Jr.  1960    Follow Up Office Visit Note    History of Present Illness:  Reed Clarke Jr. is a 63 y.o. male who presents to the clinic for  Persistent atrial fibrillation- He occasional feels something is not right, he went for cardioversion but was in sinus, now again in AF, , will increase Sotalol to 160 mg bid, will see Dr Chi again in 3 weeks, he has prior ablation for flutter and also AF, will come back in 10 days for an EKG, the QT today is 405    The following portions of the patient's history were reviewed and updated as appropriate: allergies, current medications, past family history, past medical history, past social history, past surgical history, and problem list.    Medications:  acetaminophen  Sotalol HCl AF tablet  Xarelto tablet    Subjective  No Known Allergies     Past Medical History:   Diagnosis Date    BMI 27.0-27.9,adult     Mild sleep apnea     cpap compliant, PS 8    Paroxysmal atrial fibrillation     Secondary cardiomyopathy        Past Surgical History:   Procedure Laterality Date    CARDIAC ELECTROPHYSIOLOGY PROCEDURE N/A 9/22/2023    Procedure: PVA, paroxysmal, hold Sotalol 4 days and Eliquis 1 day;  Surgeon: Frank Chi MD;  Location: [facility] INVASIVE LOCATION;  Service: Cardiovascular;  Laterality: N/A;    OTHER SURGICAL HISTORY      PVA 09/22/2023 PER DR. CHI    TONSILLECTOMY      TRANSESOPHAGEAL ECHOCARDIOGRAM (GISSELL) AND CARDIOVERSION      x2    WISDOM TOOTH EXTRACTION         Family History   Problem Relation Age of Onset    Prostate cancer Father     Diabetes Other         Social History     Socioeconomic History    Marital status:    Tobacco Use    Smoking status: Never    Smokeless tobacco: Current     Types: Chew   Vaping Use    Vaping Use: Never used   Substance and Sexual Activity    Alcohol  "use: Yes     Comment: once or twice a month    Drug use: Never    Sexual activity: Defer       Review of Systems   Constitutional: Negative.    HENT: Negative.     Respiratory: Negative.     Cardiovascular: Negative.    Endocrine: Negative.    Genitourinary: Negative.    Musculoskeletal: Negative.    Skin: Negative.    Allergic/Immunologic: Negative.    Neurological: Negative.    Hematological: Negative.    Psychiatric/Behavioral: Negative.         Cardiovascular Procedures    ECHO/MUGA:  STRESS TESTS:   CARDIAC CATH:   DEVICES:   HOLTER:   CT/MRI:   VASCULAR:   CARDIOTHORACIC:     Objective  Vitals:    01/03/24 0926   BP: 100/72   BP Location: Right arm   Patient Position: Sitting   Cuff Size: Adult   Pulse: 103   SpO2: 96%   Weight: 98 kg (216 lb)   Height: 180.3 cm (71\")     Body mass index is 30.13 kg/m².     Physical Exam  Vitals reviewed.   Constitutional:       Appearance: Healthy appearance. Not in distress.   Eyes:      Pupils: Pupils are equal, round, and reactive to light.   HENT:    Mouth/Throat:      Pharynx: Oropharynx is clear.   Neck:      Thyroid: Thyroid normal.      Vascular: No JVR. JVD normal.   Pulmonary:      Effort: Pulmonary effort is normal.      Breath sounds: Normal breath sounds. No wheezing. No rhonchi. No rales.   Chest:      Chest wall: Not tender to palpatation.   Cardiovascular:      PMI at left midclavicular line. Normal rate. Regular rhythm. Normal S1. Normal S2.       Murmurs: There is no murmur.      No gallop.  No click. No rub.   Pulses:     Intact distal pulses.      Carotid: 3+ bilaterally.     Radial: 3+ bilaterally.     Femoral: 3+ bilaterally.     Dorsalis pedis: 3+ bilaterally.     Posterior tibial: 3+ bilaterally.  Edema:     Peripheral edema absent.   Abdominal:      General: Bowel sounds are normal.      Palpations: Abdomen is soft.      Tenderness: There is no abdominal tenderness.   Musculoskeletal: Normal range of motion.         General: No tenderness.      " Cervical back: Normal range of motion and neck supple. Skin:     General: Skin is warm and dry.   Neurological:      General: No focal deficit present.      Mental Status: Alert and oriented to person, place and time.        Diagnostic Data    ECG 12 Lead    Date/Time: 1/3/2024 10:16 AM  Performed by: Andrea Hernández MD    Authorized by: Andrea Hernández MD  Comparison: compared with previous ECG from 12/27/2023  Similar to previous ECG  Rhythm: atrial fibrillation  Rate: tachycardic  BPM: 103  QRS axis: normal    Clinical impression: abnormal EKG        Assessment and Plan  Diagnoses and all orders for this visit:    History of cardiomyopathy-with recovery of the EF likely tachycardia induced cardiomyopathy    Persistent atrial fibrillation- Again on AF, will increase the Sotalol to 160 mg bid, keep Xarelto 20 mg    Obstructive sleep apnea on CPAP         Return in about 3 months (around 4/3/2024) for Recheck with Dr. Hernández.    Andrea Hernández MD  01/03/2024

## 2024-01-12 ENCOUNTER — CLINICAL SUPPORT (OUTPATIENT)
Dept: CARDIOLOGY | Facility: CLINIC | Age: 64
End: 2024-01-12
Payer: COMMERCIAL

## 2024-01-12 DIAGNOSIS — I48.19 PERSISTENT ATRIAL FIBRILLATION: Primary | ICD-10-CM

## 2024-01-12 NOTE — PROGRESS NOTES
ECG 12 Lead    Date/Time: 1/12/2024 8:47 AM  Performed by: Andrea Hernández MD    Authorized by: Andrea Hernández MD  Comparison: compared with previous ECG from 1/3/2024  Similar to previous ECG  Rhythm: atrial fibrillation  Rate: tachycardic  BPM: 107  QRS axis: normal    Clinical impression: abnormal EKG

## 2024-01-17 ENCOUNTER — CLINICAL SUPPORT (OUTPATIENT)
Dept: CARDIOLOGY | Facility: CLINIC | Age: 64
End: 2024-01-17
Payer: COMMERCIAL

## 2024-01-17 DIAGNOSIS — I48.19 PERSISTENT ATRIAL FIBRILLATION: Primary | ICD-10-CM

## 2024-01-17 RX ORDER — SOTALOL HYDROCHLORIDE 160 MG/1
160 TABLET ORAL 2 TIMES DAILY
COMMUNITY

## 2024-01-17 NOTE — PROGRESS NOTES
ECG 12 Lead    Date/Time: 1/17/2024 10:53 AM  Performed by: Andrea Hernández MD    Authorized by: Andrea Hernández MD  Comparison: compared with previous ECG from 1/12/2024  Comparison to previous ECG: Back to sinus  Rhythm: sinus rhythm  Rate: bradycardic  BPM: 59  QRS axis: normal  Other findings: non-specific ST-T wave changes    Clinical impression: normal ECG

## 2024-01-18 NOTE — Clinical Note
All Patches/Pads removed. Skin Intact. Subjective   Patient ID: Jeffrey is a 47 year old male.    Chief Complaint   Patient presents with    Office Visit     Pt refused flu shot.    Annual Exam    Refill Request    Rash     Rash in the face ~2 months       Patient Active Problem List   Diagnosis    History of 2019 novel coronavirus disease (COVID-19)    Essential hypertension    Anxiety and depression    Encounter for general adult medical examination w/o abnormal findings    Pterygium of both eyes    History of bilateral inguinal hernia repair    Pharyngitis    Current moderate episode of major depressive disorder without prior episode (CMD)    Allergic rhinitis       Past Medical History:   Diagnosis Date    Anxiety and depression 02/13/2021    Encounter for general adult medical examination w/o abnormal findings 02/13/2021     Past Surgical History:   Procedure Laterality Date    Hernia repair  1988, 1998     Family History   Problem Relation Age of Onset    Diabetes Mother        Current Outpatient Medications   Medication Sig    sertraline (ZOLOFT) 50 MG tablet Take 1 tablet by mouth daily.    atenolol (TENORMIN) 25 MG tablet Take 1 tablet by mouth daily.     No current facility-administered medications for this visit.     ALLERGIES:  No Known Allergies    Review of Systems    Objective   Visit Vitals  /64 (BP Location: LUE - Left upper extremity, Patient Position: Sitting, Cuff Size: Regular)   Pulse 75   Temp 98 °F (36.7 °C) (Temporal)   Resp 16   Ht 5' 5\" (1.651 m)   Wt 94.3 kg (208 lb)   SpO2 98%   BMI 34.61 kg/m²     Physical Exam       Assessment   Problem List Items Addressed This Visit    None  Visit Diagnoses       Colon cancer screening    -  Primary            PLAN  ***        This note was created using the Dragon voice recognition system. Errors in content may be related to improper recognition of the system. Effort to review and correct the note has been made but irregularities may still be present.

## 2024-01-25 ENCOUNTER — OFFICE VISIT (OUTPATIENT)
Dept: CARDIOLOGY | Facility: CLINIC | Age: 64
End: 2024-01-25
Payer: COMMERCIAL

## 2024-01-25 VITALS
DIASTOLIC BLOOD PRESSURE: 72 MMHG | BODY MASS INDEX: 30.1 KG/M2 | HEIGHT: 71 IN | OXYGEN SATURATION: 96 % | HEART RATE: 53 BPM | WEIGHT: 215 LBS | SYSTOLIC BLOOD PRESSURE: 110 MMHG

## 2024-01-25 DIAGNOSIS — I48.19 PERSISTENT ATRIAL FIBRILLATION: Primary | ICD-10-CM

## 2024-01-25 DIAGNOSIS — Z86.79 HISTORY OF CARDIOMYOPATHY: ICD-10-CM

## 2024-01-25 DIAGNOSIS — R06.02 SOB (SHORTNESS OF BREATH) ON EXERTION: ICD-10-CM

## 2024-01-25 DIAGNOSIS — R91.1 PULMONARY NODULE: ICD-10-CM

## 2024-01-25 DIAGNOSIS — I27.20 PULMONARY HTN: ICD-10-CM

## 2024-01-25 RX ORDER — METOPROLOL SUCCINATE 50 MG/1
50 TABLET, EXTENDED RELEASE ORAL DAILY
COMMUNITY
Start: 2024-01-23 | End: 2024-01-25

## 2024-01-25 RX ORDER — SOTALOL HYDROCHLORIDE 80 MG/1
80 TABLET ORAL
COMMUNITY
Start: 2023-12-28 | End: 2024-01-25

## 2024-01-25 NOTE — PROGRESS NOTES
Cardiac Electrophysiology Outpatient Note  [facility]    Office Visit     Reed Clarke Jr.  [id number]  01/25/2024    Primary Care Physician: Héctor Kahn MD    Referred By: No ref. provider found    Subjective     Chief Complaint   Patient presents with    Cardiomyopathy       History of Present Illness:   Reed Clarke Jr. is a 63 y.o. male who presents to my electrophysiology clinic for follow up of atrial fibrillation status post ablation in September 2023.  This consisted of pulmonary vein isolation, left atrial roofline creation and ablation of typical CTI dependent atrial flutter.  Unfortunately, pulmonary vein isolation was quite difficult.  He has since had several recurrences of atrial fibrillation.  He was restarted on sotalol and eventually increased back up to 160 mg twice daily.  This does seem to be helping.  Also has shortness of breath on exertion.  This been going on for some time.  Continues to feel as though he is going in and out of rhythm.    Past Medical History:   Diagnosis Date    BMI 27.0-27.9,adult     Mild sleep apnea     cpap compliant, PS 8    Paroxysmal atrial fibrillation     Secondary cardiomyopathy        Past Surgical History:   Procedure Laterality Date    CARDIAC ELECTROPHYSIOLOGY PROCEDURE N/A 9/22/2023    Procedure: PVA, paroxysmal, hold Sotalol 4 days and Eliquis 1 day;  Surgeon: Frank Chi MD;  Location: [facility] INVASIVE LOCATION;  Service: Cardiovascular;  Laterality: N/A;    OTHER SURGICAL HISTORY      PVA 09/22/2023 PER DR. CHI    TONSILLECTOMY      TRANSESOPHAGEAL ECHOCARDIOGRAM (GISSELL) AND CARDIOVERSION      x2    WISDOM TOOTH EXTRACTION         Family History   Problem Relation Age of Onset    Prostate cancer Father     Diabetes Other        Social History     Socioeconomic History    Marital status:    Tobacco Use    Smoking status: Never    Smokeless tobacco: Current     Types: Chew   Vaping Use    Vaping  "Use: Never used   Substance and Sexual Activity    Alcohol use: Yes     Comment: once or twice a month    Drug use: Never    Sexual activity: Defer         Current Outpatient Medications:     acetaminophen (TYLENOL) 500 MG tablet, Take 1 tablet by mouth Every 6 (Six) Hours As Needed for Mild Pain., Disp: , Rfl:     rivaroxaban (Xarelto) 20 MG tablet, TAKE ONE TABLET BY MOUTH DAILY, Disp: 90 tablet, Rfl: 1    sotalol (BETAPACE) 160 MG tablet, Take 1 tablet by mouth 2 (Two) Times a Day., Disp: , Rfl:     Allergies:   No Known Allergies    Objective   Vital Signs: Blood pressure 110/72, pulse 53, height 180.3 cm (71\"), weight 97.5 kg (215 lb), SpO2 96%.    PHYSICAL EXAM  General appearance: Awake, alert, cooperative  Head: Normocephalic, without obvious abnormality, atraumatic  Neck: No JVD  Lungs: Clear to ascultation bilaterally  Heart: Regular rate and rhythm, no murmurs, 2+ LE pulses, no lower extremity swelling  Skin: Skin color, turgor normal, no rashes or lesions  Neurologic: Grossly normal     Lab Results   Component Value Date    GLUCOSE 99 09/20/2023    CALCIUM 9.2 09/20/2023     09/20/2023    K 4.0 09/20/2023    CO2 28.0 09/20/2023     09/20/2023    BUN 8 09/20/2023    CREATININE 0.83 09/20/2023    BCR 9.6 09/20/2023    ANIONGAP 8.0 09/20/2023     Lab Results   Component Value Date    WBC 7.12 09/20/2023    HGB 14.9 09/20/2023    HCT 46.3 09/20/2023    MCV 91.1 09/20/2023     09/20/2023     No results found for: \"INR\", \"PROTIME\"  Lab Results   Component Value Date    TSH 1.200 09/20/2023          Results for orders placed in visit on 09/06/22    Adult Transthoracic Echo Complete W/ Cont if Necessary Per Protocol    Interpretation Summary  · Estimated left ventricular EF = 62% Estimated left ventricular EF was in agreement with the calculated left ventricular EF. Left ventricular ejection fraction appears to be 61 - 65%. Left ventricular systolic function is normal.  · Left ventricular " diastolic function is consistent with (grade II w/high LAP) pseudonormalization.  · Left ventricular mass index is increased.  · Left atrial volume is mildly increased.  · The right atrial cavity is mildly dilated.  · There is moderate, bileaflet mitral valve thickening present.  · Mild mitral valve regurgitation is present.  · Moderate tricuspid valve regurgitation is present.  · Estimated right ventricular systolic pressure from tricuspid regurgitation is moderately elevated (45-55 mmHg).  · The right ventricular cavity is mildly dilated.  · Mild aortic sclerosis  · Moderate TR, Mild MR.  · No pericardial effusion         I personally viewed and interpreted the patient's EKG/Telemetry/lab data      ECG 12 Lead    Date/Time: 1/25/2024 3:15 PM  Performed by: Frank Chi MD    Authorized by: Frank Chi MD  Comparison: compared with previous ECG from 1/17/2024  Similar to previous ECG  Comments: Sinus bradycardia without other abnormality          Reed Clarke JrAddison  reports that he has never smoked. His smokeless tobacco use includes chew.. I    Assessment & Plan    1. Persistent atrial fibrillation  He has a history of atrial fibrillation status post ablation in September 2023.  This consisted of pulmonary vein isolation, left atrial roofline creation and ablation of typical CTI dependent atrial flutter.  Unfortunately, pulmonary vein isolation was quite difficult.  He has since had several recurrences of atrial fibrillation.  He was restarted on sotalol and eventually increased back up to 160 mg twice daily.  This does seem to be helping.    We discussed options going forward including continued sotalol, switching to another antiarrhythmic versus Tikosyn, versus repeat catheter ablation.  He likely has some degree of pulmonary vein reconnection, noting how hard it was to achieve initial isolation.  At the current time he is not too bothered by his current degree of symptoms.  I recommended monitoring for  another 3 months on sotalol to see how he does.  At that point we can reassess his symptoms and see if we would like to undergo a different treatment plan.    2. History of cardiomyopathy  He has a history of nonischemic cardiomyopathy with since normalized ejection fraction.  Continues to follow with Dr. Hernández.  Appears well compensated currently.    3. Pulmonary nodule  He was found to have incidental pulmonary nodule consolidation on his preablation CT.  We obtained a full process CT with recommendations to repeat in 3 months.  This is scheduled for tomorrow.  Will follow-up with him after this.    4. Pulmonary HTN  Last echo shows moderate pulmonary hypertension with an RVSP estimated 45 to 55 mmHg.  This was in 2022.  This most likely represents debris show group 2 pulm hypertension due to left heart disease.  Depending on symptoms going forward could repeat echo and consider additional workup for pulmonary hypertension.    5. SOB (shortness of breath) on exertion  He does have shortness of breath on exertion.  Current time this is stable and not too bothersome to him.  This is likely a combination of his diastolic dysfunction and pulmonary hypertension.       Follow Up:  Return for Next scheduled follow up.      Thank you for allowing me to participate in the care of your patient. Please do not hesitate to contact me with additional questions or concerns.      Frank Chi M.D.  Cardiac Electrophysiologist  [facility] / [facility]

## 2024-01-26 ENCOUNTER — HOSPITAL ENCOUNTER (OUTPATIENT)
Dept: CT IMAGING | Facility: HOSPITAL | Age: 64
Discharge: HOME | End: 2024-01-26
Admitting: STUDENT IN AN ORGANIZED HEALTH CARE EDUCATION/TRAINING PROGRAM
Payer: COMMERCIAL

## 2024-01-26 DIAGNOSIS — R91.1 LUNG NODULE: ICD-10-CM

## 2024-01-26 PROCEDURE — 71260 CT THORAX DX C+: CPT

## 2024-01-26 PROCEDURE — 25510000001 IOPAMIDOL 61 % SOLUTION: Performed by: STUDENT IN AN ORGANIZED HEALTH CARE EDUCATION/TRAINING PROGRAM

## 2024-01-26 RX ADMIN — IOPAMIDOL 75 ML: 612 INJECTION, SOLUTION INTRAVENOUS at 10:08

## 2024-01-30 ENCOUNTER — TELEPHONE (OUTPATIENT)
Dept: CARDIOLOGY | Facility: CLINIC | Age: 64
End: 2024-01-30
Payer: COMMERCIAL

## 2024-01-30 NOTE — TELEPHONE ENCOUNTER
----- Message from Frank Chi MD sent at 1/29/2024  9:20 PM EST -----  Can you let him know that his chest CT looked good.  The area that the radiologist noted before was completely resolved.  They did see some pulmonary nodules that were the same size as on the prior CT.  Per the radiology recommendations would be for repeat CT scan in 1 year.  ----- Message -----  From: Interface, Rad Results [employer] In  Sent: 1/26/2024   5:56 PM EST  To: Frank Chi MD

## 2024-02-05 ENCOUNTER — TELEPHONE (OUTPATIENT)
Dept: CARDIOLOGY | Facility: CLINIC | Age: 64
End: 2024-02-05
Payer: COMMERCIAL

## 2024-02-05 RX ORDER — SOTALOL HYDROCHLORIDE 160 MG/1
160 TABLET ORAL 2 TIMES DAILY
Qty: 60 TABLET | Refills: 5 | Status: SHIPPED | OUTPATIENT
Start: 2024-02-05

## 2024-02-05 NOTE — TELEPHONE ENCOUNTER
SAYING THAT HARRISON SKY IS SHOWING A DIFFERENT DOSAGE THAN WHAT IS IN HIS CHART    PLEASE ADVISE

## 2024-02-25 LAB — CREAT BLDA-MCNC: 1 MG/DL (ref 0.6–1.3)

## 2024-04-03 ENCOUNTER — OFFICE VISIT (OUTPATIENT)
Dept: CARDIOLOGY | Facility: CLINIC | Age: 64
End: 2024-04-03
Payer: COMMERCIAL

## 2024-04-03 VITALS
SYSTOLIC BLOOD PRESSURE: 116 MMHG | WEIGHT: 207 LBS | DIASTOLIC BLOOD PRESSURE: 74 MMHG | BODY MASS INDEX: 28.98 KG/M2 | HEART RATE: 61 BPM | HEIGHT: 71 IN | RESPIRATION RATE: 16 BRPM | OXYGEN SATURATION: 98 %

## 2024-04-03 DIAGNOSIS — Z86.79 HISTORY OF CARDIOMYOPATHY: ICD-10-CM

## 2024-04-03 DIAGNOSIS — I48.19 PERSISTENT ATRIAL FIBRILLATION: Primary | ICD-10-CM

## 2024-04-03 DIAGNOSIS — G47.33 OBSTRUCTIVE SLEEP APNEA ON CPAP: ICD-10-CM

## 2024-04-03 PROCEDURE — 99214 OFFICE O/P EST MOD 30 MIN: CPT | Performed by: INTERNAL MEDICINE

## 2024-04-03 PROCEDURE — 93000 ELECTROCARDIOGRAM COMPLETE: CPT | Performed by: INTERNAL MEDICINE

## 2024-04-03 NOTE — PROGRESS NOTES
[facility] [place]  [facility] CARDIOLOGY  [address] [place] [address]  Dept: [phone]  Dept Fax: [phone]    Reed Clarke Jr.  1960    Follow Up Office Visit Note    History of Present Illness:  Reed Clarke Jr. is a 63 y.o. male who presents to the clinic for Follow-up.Persistent atrial fibrillation and Flutter-0 He denies any complaints, he has noticed on the monitor AF on and off, today he is on AF, denies complaints on Sotalol 160 mg bid and Xarelto 20 mg, has seen Dr Chi and he was given the choice of tikosyn vs new ablation, he will discuss with him again in few weeks EKG Af Hr 71    The following portions of the patient's history were reviewed and updated as appropriate: allergies, current medications, past family history, past medical history, past social history, past surgical history, and problem list.    Medications:  acetaminophen  sotalol  Xarelto tablet    Subjective  No Known Allergies     Past Medical History:   Diagnosis Date    BMI 27.0-27.9,adult     Mild sleep apnea     cpap compliant, PS 8    Paroxysmal atrial fibrillation     Secondary cardiomyopathy        Past Surgical History:   Procedure Laterality Date    CARDIAC ELECTROPHYSIOLOGY PROCEDURE N/A 9/22/2023    Procedure: PVA, paroxysmal, hold Sotalol 4 days and Eliquis 1 day;  Surgeon: Frank Chi MD;  Location: [facility] INVASIVE LOCATION;  Service: Cardiovascular;  Laterality: N/A;    OTHER SURGICAL HISTORY      PVA 09/22/2023 PER DR. CHI    TONSILLECTOMY      TRANSESOPHAGEAL ECHOCARDIOGRAM (GISSELL) AND CARDIOVERSION      x2    WISDOM TOOTH EXTRACTION         Family History   Problem Relation Age of Onset    Prostate cancer Father     Diabetes Other         Social History     Socioeconomic History    Marital status:    Tobacco Use    Smoking status: Never    Smokeless tobacco: Current     Types: Chew   Vaping Use    Vaping status: Never Used   Substance and Sexual Activity    Alcohol  "use: Yes     Comment: once or twice a month    Drug use: Never    Sexual activity: Defer       Review of Systems   Constitutional: Negative.    HENT: Negative.     Respiratory: Negative.     Cardiovascular: Negative.    Endocrine: Negative.    Genitourinary: Negative.    Musculoskeletal: Negative.    Skin: Negative.    Allergic/Immunologic: Negative.    Neurological: Negative.    Hematological: Negative.    Psychiatric/Behavioral: Negative.       Cardiovascular Procedures    ECHO/MUGA:  STRESS TESTS:   CARDIAC CATH:   DEVICES:   HOLTER:   CT/MRI:   VASCULAR:   CARDIOTHORACIC:     Objective  Vitals:    04/03/24 0937   BP: 116/74   BP Location: Right arm   Patient Position: Lying   Cuff Size: Adult   Pulse: 61   Resp: 16   SpO2: 98%   Weight: 93.9 kg (207 lb)   Height: 180.3 cm (71\")   PainSc: 0-No pain     Body mass index is 28.87 kg/m².     Physical Exam  Vitals reviewed.   Constitutional:       Appearance: Healthy appearance. Not in distress.   Eyes:      Pupils: Pupils are equal, round, and reactive to light.   HENT:    Mouth/Throat:      Pharynx: Oropharynx is clear.   Neck:      Thyroid: Thyroid normal.      Vascular: No JVR. JVD normal.   Pulmonary:      Effort: Pulmonary effort is normal.      Breath sounds: Normal breath sounds. No wheezing. No rhonchi. No rales.   Chest:      Chest wall: Not tender to palpatation.   Cardiovascular:      PMI at left midclavicular line. Normal rate. Irregularly irregular rhythm. Normal S1. Normal S2.       Murmurs: There is no murmur.      No gallop.  No click. No rub.   Pulses:     Intact distal pulses.      Carotid: 3+ bilaterally.     Radial: 3+ bilaterally.     Femoral: 3+ bilaterally.     Dorsalis pedis: 3+ bilaterally.     Posterior tibial: 3+ bilaterally.  Edema:     Peripheral edema absent.   Abdominal:      General: Bowel sounds are normal.      Palpations: Abdomen is soft.      Tenderness: There is no abdominal tenderness.   Musculoskeletal: Normal range of motion.   "       General: No tenderness.      Cervical back: Normal range of motion and neck supple. Skin:     General: Skin is warm and dry.   Neurological:      General: No focal deficit present.      Mental Status: Alert and oriented to person, place and time.        Diagnostic Data    ECG 12 Lead    Date/Time: 4/3/2024 10:22 AM  Performed by: Andrea Hernández MD    Authorized by: Andrea Hernández MD  Comparison: compared with previous ECG from 1/25/2024  Similar to previous ECG  Rhythm: atrial fibrillation  Rate: normal  BPM: 71  QRS axis: normal  Other findings: non-specific ST-T wave changes    Clinical impression: abnormal EKG        Assessment and Plan  Diagnoses and all orders for this visit:    Persistent atrial fibrillation-On Sotalol 160 mg bid,   now in AF, seems on and off, will see Dr Alon pardo    History of cardiomyopathy- with recovery of the Ef to normal, likeley tachycardia induce cardiomyopathy- off all meds no complaints     Obstructive sleep apnea on CPAP         Return in about 6 months (around 10/3/2024) for Recheck with Dr. Hernández.    Andrea Hernández MD  04/03/2024

## 2024-04-25 ENCOUNTER — OFFICE VISIT (OUTPATIENT)
Dept: CARDIOLOGY | Facility: CLINIC | Age: 64
End: 2024-04-25
Payer: COMMERCIAL

## 2024-04-25 VITALS
HEART RATE: 55 BPM | BODY MASS INDEX: 28.28 KG/M2 | WEIGHT: 202 LBS | DIASTOLIC BLOOD PRESSURE: 62 MMHG | HEIGHT: 71 IN | SYSTOLIC BLOOD PRESSURE: 102 MMHG | OXYGEN SATURATION: 97 %

## 2024-04-25 DIAGNOSIS — I48.19 PERSISTENT ATRIAL FIBRILLATION: Primary | ICD-10-CM

## 2024-04-25 DIAGNOSIS — Z86.79 HISTORY OF CARDIOMYOPATHY: ICD-10-CM

## 2024-04-25 DIAGNOSIS — I27.20 PULMONARY HTN: ICD-10-CM

## 2024-04-27 NOTE — PROGRESS NOTES
Cardiac Electrophysiology Outpatient Note  [facility]    Office Visit     Reed Clarke Jr.  [id number]  01/25/2024    Primary Care Physician: Héctor Kahn MD    Referred By: No ref. provider found    Subjective     Chief Complaint   Patient presents with    Persistent atrial fibrillation       History of Present Illness:   Reed Clarke Jr. is a 63 y.o. male who presents to my electrophysiology clinic for follow up of atrial fibrillation status post ablation in September 2023.  This consisted of pulmonary vein isolation, left atrial roofline creation and ablation of typical CTI dependent atrial flutter.  Unfortunately, pulmonary vein isolation was quite difficult.  He has since had several recurrences of atrial fibrillation.  He was restarted on sotalol and eventually increased back up to 160 mg twice daily.  This does seem to be helping, but he does continue to have episodes of atrial fibrillation.  Feels as though he is pretty frequently going in and out of A-fib.  Has episodes at least 2-3 times per week.    Past Medical History:   Diagnosis Date    BMI 27.0-27.9,adult     Mild sleep apnea     cpap compliant, PS 8    Paroxysmal atrial fibrillation     Secondary cardiomyopathy        Past Surgical History:   Procedure Laterality Date    CARDIAC ELECTROPHYSIOLOGY PROCEDURE N/A 9/22/2023    Procedure: PVA, paroxysmal, hold Sotalol 4 days and Eliquis 1 day;  Surgeon: Frank Chi MD;  Location: [facility] INVASIVE LOCATION;  Service: Cardiovascular;  Laterality: N/A;    OTHER SURGICAL HISTORY      PVA 09/22/2023 PER DR. CHI    TONSILLECTOMY      TRANSESOPHAGEAL ECHOCARDIOGRAM (GISSELL) AND CARDIOVERSION      x2    WISDOM TOOTH EXTRACTION         Family History   Problem Relation Age of Onset    Prostate cancer Father     Diabetes Other        Social History     Socioeconomic History    Marital status:    Tobacco Use    Smoking status: Never    Smokeless tobacco:  "Current     Types: Chew   Vaping Use    Vaping status: Never Used   Substance and Sexual Activity    Alcohol use: Yes     Comment: once or twice a month    Drug use: Never    Sexual activity: Defer         Current Outpatient Medications:     acetaminophen (TYLENOL) 500 MG tablet, Take 1 tablet by mouth Every 6 (Six) Hours As Needed for Mild Pain., Disp: , Rfl:     rivaroxaban (Xarelto) 20 MG tablet, TAKE ONE TABLET BY MOUTH DAILY, Disp: 90 tablet, Rfl: 1    sotalol (BETAPACE) 160 MG tablet, Take 1 tablet by mouth 2 (Two) Times a Day., Disp: 60 tablet, Rfl: 5    Allergies:   No Known Allergies    Objective   Vital Signs: Blood pressure 102/62, pulse 55, height 180.3 cm (71\"), weight 91.6 kg (202 lb), SpO2 97%.    PHYSICAL EXAM  General appearance: Awake, alert, cooperative  Head: Normocephalic, without obvious abnormality, atraumatic  Neck: No JVD  Lungs: Clear to ascultation bilaterally  Heart: Regular rate and rhythm, no murmurs, 2+ LE pulses, no lower extremity swelling  Skin: Skin color, turgor normal, no rashes or lesions  Neurologic: Grossly normal     Lab Results   Component Value Date    GLUCOSE 99 09/20/2023    CALCIUM 9.2 09/20/2023     09/20/2023    K 4.0 09/20/2023    CO2 28.0 09/20/2023     09/20/2023    BUN 8 09/20/2023    CREATININE 1.00 01/26/2024    BCR 9.6 09/20/2023    ANIONGAP 8.0 09/20/2023     Lab Results   Component Value Date    WBC 7.12 09/20/2023    HGB 14.9 09/20/2023    HCT 46.3 09/20/2023    MCV 91.1 09/20/2023     09/20/2023     No results found for: \"INR\", \"PROTIME\"  Lab Results   Component Value Date    TSH 1.200 09/20/2023          Results for orders placed in visit on 09/06/22    Adult Transthoracic Echo Complete W/ Cont if Necessary Per Protocol    Interpretation Summary  · Estimated left ventricular EF = 62% Estimated left ventricular EF was in agreement with the calculated left ventricular EF. Left ventricular ejection fraction appears to be 61 - 65%. Left " ventricular systolic function is normal.  · Left ventricular diastolic function is consistent with (grade II w/high LAP) pseudonormalization.  · Left ventricular mass index is increased.  · Left atrial volume is mildly increased.  · The right atrial cavity is mildly dilated.  · There is moderate, bileaflet mitral valve thickening present.  · Mild mitral valve regurgitation is present.  · Moderate tricuspid valve regurgitation is present.  · Estimated right ventricular systolic pressure from tricuspid regurgitation is moderately elevated (45-55 mmHg).  · The right ventricular cavity is mildly dilated.  · Mild aortic sclerosis  · Moderate TR, Mild MR.  · No pericardial effusion         I personally viewed and interpreted the patient's EKG/Telemetry/lab data    Procedures    EKG shows sinus bradycardia without other abnormalities    Reed Clarke Jr.  reports that he has never smoked. His smokeless tobacco use includes chew.. I    Assessment & Plan    1. Persistent atrial fibrillation  He has a history of atrial fibrillation status post ablation in September 2023.  This consisted of pulmonary vein isolation, left atrial roofline creation and ablation of typical CTI dependent atrial flutter.  Unfortunately, pulmonary vein isolation was quite difficult.  He has since had several recurrences of atrial fibrillation.  He was restarted on sotalol and eventually increased back up to 160 mg twice daily.  This does seem to be helping.    We discussed options going forward including continued sotalol, switching to another antiarrhythmic versus Tikosyn, versus repeat catheter ablation.  I think a good candidate for repeat catheter ablation.  I am hopeful that, despite the difficulty of the initial procedure, we would be able to find a clear area of pulmonary vein reconnection that was due to improving symptoms.  He is going to consider this option strongly.    2. History of cardiomyopathy  He has a history of nonischemic  cardiomyopathy with since normalized ejection fraction.  Continues to follow with Dr. Hernández.  Appears well compensated currently.    3. Pulmonary nodule  He was found to have incidental pulmonary nodule consolidation on his preablation CT. most recent CT in January 2024 showed the right upper lobe airspace disease had resolved and there was small nodule in the right upper lobe.  They recommended repeat follow-up in 1 year.  Will plan for this in January 2025.    4. Pulmonary HTN  Last echo shows moderate pulmonary hypertension with an RVSP estimated 45 to 55 mmHg.  This was in 2022.  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  Depending on symptoms going forward could repeat echo and consider additional workup for pulmonary hypertension.    5. SOB (shortness of breath) on exertion  He does have shortness of breath on exertion.  Current time this is stable and not too bothersome to him.  This is likely a combination of his diastolic dysfunction and pulmonary hypertension.    6.  Sinus node dysfunction  He has evidence of mild sinus node dysfunction with bradycardia.  Today heart rate is 55.  No clear symptoms and no indication for pacemaker at the current time.         Follow Up:  No follow-ups on file.      Thank you for allowing me to participate in the care of your patient. Please do not hesitate to contact me with additional questions or concerns.      Frank Chi M.D.  Cardiac Electrophysiologist  [facility] / [facility]

## 2024-05-01 ENCOUNTER — TELEPHONE (OUTPATIENT)
Dept: CARDIOLOGY | Facility: CLINIC | Age: 64
End: 2024-05-01
Payer: COMMERCIAL

## 2024-05-01 DIAGNOSIS — I48.19 PERSISTENT ATRIAL FIBRILLATION: Primary | ICD-10-CM

## 2024-05-01 NOTE — TELEPHONE ENCOUNTER
Patient has decided that he would like to proceed with having a PVA. Does he need to hold any meds prior to?

## 2024-05-24 ENCOUNTER — PREP FOR SURGERY (OUTPATIENT)
Dept: OTHER | Facility: HOSPITAL | Age: 64
End: 2024-05-24
Payer: COMMERCIAL

## 2024-05-24 DIAGNOSIS — I48.19 PERSISTENT ATRIAL FIBRILLATION: Primary | ICD-10-CM

## 2024-05-24 RX ORDER — SODIUM CHLORIDE 9 MG/ML
40 INJECTION, SOLUTION INTRAVENOUS AS NEEDED
OUTPATIENT
Start: 2024-05-24

## 2024-05-24 RX ORDER — ACETAMINOPHEN 325 MG/1
650 TABLET ORAL EVERY 4 HOURS PRN
OUTPATIENT
Start: 2024-05-24

## 2024-05-24 RX ORDER — NITROGLYCERIN 0.4 MG/1
0.4 TABLET SUBLINGUAL
OUTPATIENT
Start: 2024-05-24

## 2024-05-24 RX ORDER — ONDANSETRON 2 MG/ML
4 INJECTION INTRAMUSCULAR; INTRAVENOUS EVERY 6 HOURS PRN
OUTPATIENT
Start: 2024-05-24

## 2024-06-07 RX ORDER — RIVAROXABAN 20 MG/1
20 TABLET, FILM COATED ORAL DAILY
Qty: 90 TABLET | Refills: 1 | Status: SHIPPED | OUTPATIENT
Start: 2024-06-07

## 2024-06-12 NOTE — NURSING NOTE
" PRE-PVA ASSESSMENT  Reed Clarke Jr. 1960   [address] [place] [address]   [phone]        Referral Source: Andrea Hernández   Information obtained from: [x] Medical record review  [x] Patient report  Scheduled for: Redo PVA on 6/19/24 with Dr. Chi  No Known Allergies    AFib Specific History:  AFIBTYPE: persistent    CHADS-VASc Risk Assessment               0 Total Score    Criteria that do not apply:    CHF    Hypertension    Age >/= 75    DM    PRIOR STROKE/TIA/THROMBO    Vascular Disease    Age 65-74    Sex: Female            Anticoagulation: Xarelto 20 mg daily NO MISSED DOSES   Cardioversion x 2  Failed AAD(s): sotalol   Prior Ablation: PVA 9/2023    Is Mr. Clarke aware of his AFib? Yes   Onset: 2019      Frequency: 2-3 x week       Last Echo(s):  [x] TTE Date: 9/6/22  Estimated left ventricular EF = 62% Estimated left ventricular EF was in agreement with the calculated left ventricular EF. Left ventricular ejection fraction appears to be 61 - 65%. Left ventricular systolic function is normal.  Left ventricular diastolic function is consistent with (grade II w/high LAP) pseudonormalization.  Left ventricular mass index is increased.  Left atrial volume is mildly increased.  The right atrial cavity is mildly dilated.  There is moderate, bileaflet mitral valve thickening present.  Mild mitral valve regurgitation is present.  Moderate tricuspid valve regurgitation is present.  Estimated right ventricular systolic pressure from tricuspid regurgitation is moderately elevated (45-55 mmHg).  The right ventricular cavity is mildly dilated.  Mild aortic sclerosis  Moderate TR, Mild MR.  No pericardial effusion    GISSELL 08/20/2019: EF 40%, severe LA enlargement, no MILTON clot. Successful ECV      Past medical History:   [] Diabetes   No             No results found for: \"HGBA1C\"       [] HYPOthyroidism  No  [] HYPERthyroidism No          TSH   Date Value Ref Range Status   09/20/2023 1.200 0.270 - 4.200 " uIU/mL Final     [] HTN  No    [x] CM   [] CVA  No                              [] TIA No        [] Ischemic         [] Hemorrhagic         [] Nonischemic         [] Embolic        [] Diastolic    [] CAD     No     [] MI  No           [] Dyslipidemia No   [] Statin indicated    [] Ischemic Evaluation No     [x] Sleep Apnea Diagnosed       Device: CPAP        Compliance: compliance all of the time    [] Obesity No BMI 28.17    Other Pertinent PMH: pulmonary HTN, pulmonary nodule     Summary of Patient Contact:    I spoke with Mr. Clarke about his upcoming PVA.   He was well informed about the procedure from prior discussion with   and from reading the provided literature.  We discussed the procedure at length including risks, anesthesia, intra-op procedures, recovery, bedrest, sheath removal, discharge criteria, normal post-procedure expectations, and success rates.  I answered a few remaining questions. Mr. Clarke verbalized understanding and he is ready to proceed.       Patient with recent splinter, seen by PCP and antibiotic prescribed.  LIZETH Rae RN

## 2024-06-13 ENCOUNTER — TELEPHONE (OUTPATIENT)
Dept: FAMILY MEDICINE CLINIC | Facility: CLINIC | Age: 64
End: 2024-06-13

## 2024-06-13 ENCOUNTER — OFFICE VISIT (OUTPATIENT)
Dept: FAMILY MEDICINE CLINIC | Facility: CLINIC | Age: 64
End: 2024-06-13
Payer: COMMERCIAL

## 2024-06-13 ENCOUNTER — CLINICAL SUPPORT (OUTPATIENT)
Dept: CARDIOLOGY | Facility: CLINIC | Age: 64
End: 2024-06-13
Payer: COMMERCIAL

## 2024-06-13 VITALS
HEIGHT: 72 IN | DIASTOLIC BLOOD PRESSURE: 80 MMHG | BODY MASS INDEX: 27.77 KG/M2 | SYSTOLIC BLOOD PRESSURE: 126 MMHG | HEART RATE: 80 BPM | OXYGEN SATURATION: 99 % | WEIGHT: 205 LBS | RESPIRATION RATE: 15 BRPM

## 2024-06-13 DIAGNOSIS — I48.19 PERSISTENT ATRIAL FIBRILLATION: ICD-10-CM

## 2024-06-13 DIAGNOSIS — S91.332A PUNCTURE WOUND OF LEFT FOOT, INITIAL ENCOUNTER: Primary | ICD-10-CM

## 2024-06-13 DIAGNOSIS — Z23 ENCOUNTER FOR IMMUNIZATION: ICD-10-CM

## 2024-06-13 DIAGNOSIS — I48.19 PERSISTENT ATRIAL FIBRILLATION: Primary | ICD-10-CM

## 2024-06-13 PROCEDURE — 90715 TDAP VACCINE 7 YRS/> IM: CPT | Performed by: PHYSICIAN ASSISTANT

## 2024-06-13 PROCEDURE — 99205 OFFICE O/P NEW HI 60 MIN: CPT | Performed by: PHYSICIAN ASSISTANT

## 2024-06-13 PROCEDURE — 90471 IMMUNIZATION ADMIN: CPT | Performed by: PHYSICIAN ASSISTANT

## 2024-06-13 RX ORDER — CIPROFLOXACIN 500 MG/1
500 TABLET, FILM COATED ORAL 2 TIMES DAILY
Qty: 20 TABLET | Refills: 0 | Status: SHIPPED | OUTPATIENT
Start: 2024-06-13

## 2024-06-13 RX ORDER — SULFAMETHOXAZOLE AND TRIMETHOPRIM 800; 160 MG/1; MG/1
1 TABLET ORAL 2 TIMES DAILY
Qty: 20 TABLET | Refills: 0 | Status: SHIPPED | OUTPATIENT
Start: 2024-06-13

## 2024-06-13 NOTE — TELEPHONE ENCOUNTER
"Caller: Reed Clarke Jr. \"RUCHI\"    Relationship: Self    Best call back number: [phone]    What is the best time to reach you: ANYTIME    Who are you requesting to speak with (clinical staff, provider,  specific staff member): DORIAN COTTER    What was the call regarding: WOULD LIKE TO GO OVER MEDICATION AND TEST RESULTS    "

## 2024-06-13 NOTE — PROGRESS NOTES
Patient Office Visit      Patient Name: Reed Clarke Jr.  : 1960   MRN: [id number]     Chief Complaint:    Chief Complaint   Patient presents with    Wound Infection       History of Present Illness: Reed Clarke Jr. is a 63 y.o. male who is here today because of a puncture wound bottom of his left foot.  The area around the puncture is red, tender and draining.  He said he had on flip-flops [place] so he is presuming that a piece of wood on the dock went up under his flip-flop and into his foot.  He is supposed to have a cardiac ablation in 6 days.  Dr. Frank Chi at [facility] will be doing the procedure.  Patient is very concerned about the possibility of having the procedure canceled because of this foot wound.  He is on sotalol and is supposed to stop 5 days prior to the procedure.  He has 1 more day to take the sotalol.    Subjective      Review of Systems:         Past Medical History:   Past Medical History:   Diagnosis Date    BMI 27.0-27.9,adult     Mild sleep apnea     cpap compliant, PS 8    Paroxysmal atrial fibrillation     Secondary cardiomyopathy        Past Surgical History:   Past Surgical History:   Procedure Laterality Date    CARDIAC ELECTROPHYSIOLOGY PROCEDURE N/A 2023    Procedure: PVA, paroxysmal, hold Sotalol 4 days and Eliquis 1 day;  Surgeon: Frank Chi MD;  Location: [facility] INVASIVE LOCATION;  Service: Cardiovascular;  Laterality: N/A;    OTHER SURGICAL HISTORY      PVA 2023 PER DR. CHI    TONSILLECTOMY      TRANSESOPHAGEAL ECHOCARDIOGRAM (GISSELL) AND CARDIOVERSION      x2    WISDOM TOOTH EXTRACTION         Family History:   Family History   Problem Relation Age of Onset    Prostate cancer Father     Diabetes Other        Social History:   Social History     Socioeconomic History    Marital status:    Tobacco Use    Smoking status: Never    Smokeless tobacco: Current     Types: Chew   Vaping Use    Vaping status: Never Used  "  Substance and Sexual Activity    Alcohol use: Yes     Comment: once or twice a month    Drug use: Never    Sexual activity: Defer       Allergies:   No Known Allergies    Objective     Physical Exam:  Vital Signs:   Vitals:    06/13/24 1111   BP: 126/80   BP Location: Right arm   Patient Position: Sitting   Cuff Size: Adult   Pulse: 80   Resp: 15   SpO2: 99%   Weight: 93 kg (205 lb)   Height: 182.9 cm (72\")     Body mass index is 27.8 kg/m².        Physical Exam  Constitutional:       Appearance: Normal appearance.   Skin:     Comments: Left foot over the middle MTP joint with a puncture wound with surrounding erythema and purulent drainage, very tender.   Neurological:      Mental Status: He is alert.         Procedures    Assessment / Plan      Assessment/Plan:   Diagnoses and all orders for this visit:    1. Puncture wound of left foot, initial encounter (Primary)  Assessment & Plan:  Patient is scheduled in 6 days for cardiac ablation for his atrial fibrillation.  He has 1 more day of his sotalol to take.  I did contact both Dr. Chi's physician assistant and Dr. Hernández regarding antibiotic choice.  Dr. Kahn also examined patient.  He is concerned about Pseudomonas infection and really wants patient on ciprofloxacin.  I got the okay from Dr. [facility] PA and from Dr. Reed to go ahead and stop sotalol as he only had 1 more day. Dr. Hernández said he does not need a repeat EKG in 2 to 3 days since he is stopping the sotalol.  I discussed the concern about protein elongation and the drug interaction between sotalol and Cipro.  Patient was able to have an 8 EKG obtained at Dr. Peterson's office since he was already on the [place] side [facility] getting an x-ray of his left foot.  His ventricular rate was 96 and he was in atrial fibrillation but his QT C was 429 ms and Dr. Peterson said it was okay for patient to go ahead and start Cipro and discontinue the sotalol as of today.  The surgeons office is aware of the " situation with the infection in his foot.  He is here on a Thursday and they said they would call him Monday to check on the status.  I did speak to patient later in the day and he said his CT scan that was scheduled tomorrow got rescheduled for Monday at noon, I will see him back on Monday morning for follow-up at 10 AM.  I did collect a culture but let him know that that culture result will not be back before Monday.  I am going to go ahead and double cover him with Cipro for Pseudomonas coverage and Bactrim for staph coverage until we get the results of the culture.  At the time of this note his x-ray report had not resulted but I did review the images and did not see any bony abnormalities and I did not see any foreign body.  I did advise him that would would not necessarily show up on x-ray.  We will reexamine him Monday and go from there.  Hopefully this will not result in a cancellation of his surgery.    Orders:  -     sulfamethoxazole-trimethoprim (Bactrim DS) 800-160 MG per tablet; Take 1 tablet by mouth 2 (Two) Times a Day.  Dispense: 20 tablet; Refill: 0  -     XR Foot 3+ View Left; Future  -     Anaerobic & Aerobic Culture ([facility] Only) - Swab, Foot, Left  -     ciprofloxacin (Cipro) 500 MG tablet; Take 1 tablet by mouth 2 (Two) Times a Day.  Dispense: 20 tablet; Refill: 0    2. Persistent atrial fibrillation  Assessment & Plan:  Scheduled for ablation next week, see plan under puncture wound of the left foot.      3. Encounter for immunization  -     Tdap Vaccine Greater Than or Equal To 6yo IM           Medications:     Current Outpatient Medications:     acetaminophen (TYLENOL) 500 MG tablet, Take 1 tablet by mouth Every 6 (Six) Hours As Needed for Mild Pain., Disp: , Rfl:     sotalol (BETAPACE) 160 MG tablet, Take 1 tablet by mouth 2 (Two) Times a Day., Disp: 60 tablet, Rfl: 5    Xarelto 20 MG tablet, TAKE 1 TABLET BY MOUTH DAILY, Disp: 90 tablet, Rfl: 1    ciprofloxacin (Cipro) 500 MG tablet, Take  1 tablet by mouth 2 (Two) Times a Day., Disp: 20 tablet, Rfl: 0    sulfamethoxazole-trimethoprim (Bactrim DS) 800-160 MG per tablet, Take 1 tablet by mouth 2 (Two) Times a Day., Disp: 20 tablet, Rfl: 0    I spent 69 minutes caring for Reed on this date of service. This time includes time spent by me in the following activities:preparing for the visit, reviewing tests, obtaining and/or reviewing a separately obtained history, performing a medically appropriate examination and/or evaluation , counseling and educating the patient/family/caregiver, ordering medications, tests, or procedures, referring and communicating with other health care professionals , documenting information in the medical record, and independently interpreting results and communicating that information with the patient/family/caregiver    Follow Up:   No follow-ups on file.    Sarah Rodriguez PA-C   [facility]     NOTE TO PATIENT: The 21st Century Cures Act makes medical notes like these available to patients in the interest of transparency. However, be advised this is a medical document. It is intended as peer to peer communication. It is written in medical language and may contain abbreviations or verbiage that are unfamiliar. It may appear blunt or direct. Medical documents are intended to carry relevant information, facts as evident, and the clinical opinion of the practitioner.

## 2024-06-13 NOTE — ASSESSMENT & PLAN NOTE
Patient is scheduled in 6 days for cardiac ablation for his atrial fibrillation.  He has 1 more day of his sotalol to take.  I did contact both Dr. Chi's physician assistant and Dr. Hernández regarding antibiotic choice.  Dr. Khan also examined patient.  He is concerned about Pseudomonas infection and really wants patient on ciprofloxacin.  I got the okay from Dr. Chivo CALIX and from Dr. Reed to go ahead and stop sotalol as he only had 1 more day. Dr. Hernández said he does not need a repeat EKG in 2 to 3 days since he is stopping the sotalol.  I discussed the concern about protein elongation and the drug interaction between sotalol and Cipro.  Patient was able to have an 8 EKG obtained at Dr. Peterson's office since he was already on the west side [facility] getting an x-ray of his left foot.  His ventricular rate was 96 and he was in atrial fibrillation but his QT C was 429 ms and Dr. Peterson said it was okay for patient to go ahead and start Cipro and discontinue the sotalol as of today.  The surgeons office is aware of the situation with the infection in his foot.  He is here on a Thursday and they said they would call him Monday to check on the status.  I did speak to patient later in the day and he said his CT scan that was scheduled tomorrow got rescheduled for Monday at noon, I will see him back on Monday morning for follow-up at 10 AM.  I did collect a culture but let him know that that culture result will not be back before Monday.  I am going to go ahead and double cover him with Cipro for Pseudomonas coverage and Bactrim for staph coverage until we get the results of the culture.  At the time of this note his x-ray report had not resulted but I did review the images and did not see any bony abnormalities and I did not see any foreign body.  I did advise him that would would not necessarily show up on x-ray.  We will reexamine him Monday and go from there.  Hopefully this will not result in a cancellation of  his surgery.

## 2024-06-13 NOTE — PROGRESS NOTES
ECG 12 Lead    Date/Time: 6/13/2024 1:03 PM  Performed by: Andrea Hernández MD    Authorized by: Andrea Hernández MD  Comparison: compared with previous ECG from 4/25/2024  Similar to previous ECG  Rhythm: atrial fibrillation  Rate: normal  BPM: 96  QRS axis: normal  Other findings: non-specific ST-T wave changes    Clinical impression: abnormal EKG

## 2024-06-17 ENCOUNTER — PRE-ADMISSION TESTING (OUTPATIENT)
Dept: PREADMISSION TESTING | Facility: HOSPITAL | Age: 64
End: 2024-06-17
Payer: COMMERCIAL

## 2024-06-17 ENCOUNTER — HOSPITAL ENCOUNTER (OUTPATIENT)
Dept: CT IMAGING | Facility: HOSPITAL | Age: 64
Discharge: HOME | End: 2024-06-17
Payer: COMMERCIAL

## 2024-06-17 ENCOUNTER — OFFICE VISIT (OUTPATIENT)
Dept: FAMILY MEDICINE CLINIC | Facility: CLINIC | Age: 64
End: 2024-06-17
Payer: COMMERCIAL

## 2024-06-17 VITALS
BODY MASS INDEX: 26.82 KG/M2 | HEART RATE: 87 BPM | TEMPERATURE: 97.8 F | SYSTOLIC BLOOD PRESSURE: 128 MMHG | HEIGHT: 72 IN | DIASTOLIC BLOOD PRESSURE: 82 MMHG | RESPIRATION RATE: 15 BRPM | WEIGHT: 198 LBS | OXYGEN SATURATION: 98 %

## 2024-06-17 DIAGNOSIS — S90.852D FOREIGN BODY IN LEFT FOOT, SUBSEQUENT ENCOUNTER: ICD-10-CM

## 2024-06-17 DIAGNOSIS — I48.19 PERSISTENT ATRIAL FIBRILLATION: ICD-10-CM

## 2024-06-17 DIAGNOSIS — S91.332D PUNCTURE WOUND OF LEFT FOOT, SUBSEQUENT ENCOUNTER: Primary | ICD-10-CM

## 2024-06-17 PROBLEM — S90.852A FOREIGN BODY IN LEFT FOOT: Status: ACTIVE | Noted: 2024-06-17

## 2024-06-17 LAB
ANION GAP SERPL CALCULATED.3IONS-SCNC: 10 MMOL/L (ref 5–15)
BACTERIA SPEC AEROBE CULT: ABNORMAL
BACTERIA SPEC ANAEROBE CULT: ABNORMAL
BACTERIA SPEC CULT: ABNORMAL
BACTERIA SPEC CULT: ABNORMAL
BUN SERPL-MCNC: 16 MG/DL (ref 8–23)
BUN/CREAT SERPL: 14.2 (ref 7–25)
CALCIUM SPEC-SCNC: 9.5 MG/DL (ref 8.6–10.5)
CHLORIDE SERPL-SCNC: 104 MMOL/L (ref 98–107)
CO2 SERPL-SCNC: 27 MMOL/L (ref 22–29)
CREAT SERPL-MCNC: 1.13 MG/DL (ref 0.76–1.27)
DEPRECATED RDW RBC AUTO: 45.4 FL (ref 37–54)
EGFRCR SERPLBLD CKD-EPI 2021: 73 ML/MIN/1.73
ERYTHROCYTE [DISTWIDTH] IN BLOOD BY AUTOMATED COUNT: 13.4 % (ref 12.3–15.4)
GLUCOSE SERPL-MCNC: 102 MG/DL (ref 65–99)
HCT VFR BLD AUTO: 48.5 % (ref 37.5–51)
HGB BLD-MCNC: 15.5 G/DL (ref 13–17.7)
MCH RBC QN AUTO: 29.5 PG (ref 26.6–33)
MCHC RBC AUTO-ENTMCNC: 32 G/DL (ref 31.5–35.7)
MCV RBC AUTO: 92.4 FL (ref 79–97)
PLATELET # BLD AUTO: 310 10*3/MM3 (ref 140–450)
PMV BLD AUTO: 9.4 FL (ref 6–12)
POTASSIUM SERPL-SCNC: 4.8 MMOL/L (ref 3.5–5.2)
RBC # BLD AUTO: 5.25 10*6/MM3 (ref 4.14–5.8)
SODIUM SERPL-SCNC: 141 MMOL/L (ref 136–145)
TSH SERPL DL<=0.05 MIU/L-ACNC: 1.15 UIU/ML (ref 0.27–4.2)
WBC NRBC COR # BLD AUTO: 7.02 10*3/MM3 (ref 3.4–10.8)

## 2024-06-17 PROCEDURE — 80048 BASIC METABOLIC PNL TOTAL CA: CPT

## 2024-06-17 PROCEDURE — 85027 COMPLETE CBC AUTOMATED: CPT

## 2024-06-17 PROCEDURE — 84443 ASSAY THYROID STIM HORMONE: CPT

## 2024-06-17 PROCEDURE — 36415 COLL VENOUS BLD VENIPUNCTURE: CPT

## 2024-06-17 PROCEDURE — 28190 REMOVAL OF FOOT FOREIGN BODY: CPT | Performed by: PHYSICIAN ASSISTANT

## 2024-06-17 PROCEDURE — 25510000001 IOPAMIDOL PER 1 ML: Performed by: STUDENT IN AN ORGANIZED HEALTH CARE EDUCATION/TRAINING PROGRAM

## 2024-06-17 PROCEDURE — 71275 CT ANGIOGRAPHY CHEST: CPT

## 2024-06-17 PROCEDURE — 99213 OFFICE O/P EST LOW 20 MIN: CPT | Performed by: PHYSICIAN ASSISTANT

## 2024-06-17 RX ADMIN — IOPAMIDOL 80 ML: 755 INJECTION, SOLUTION INTRAVENOUS at 13:43

## 2024-06-17 NOTE — PROGRESS NOTES
"      Patient Office Visit      Patient Name: Reed Clarke Jr.  : 1960   MRN: [id number]     Chief Complaint:    Chief Complaint   Patient presents with    Puncture Wound       History of Present Illness: Reed Clarke Jr. is a 63 y.o. male who is here today for follow-up puncture wound left foot.  Cannot put any weight directly over the area of injury.    Subjective      Review of Systems:         Past Medical History:   Past Medical History:   Diagnosis Date    BMI 27.0-27.9,adult     Mild sleep apnea     cpap compliant, PS 8    Paroxysmal atrial fibrillation     Secondary cardiomyopathy        Past Surgical History:   Past Surgical History:   Procedure Laterality Date    CARDIAC ELECTROPHYSIOLOGY PROCEDURE N/A 2023    Procedure: PVA, paroxysmal, hold Sotalol 4 days and Eliquis 1 day;  Surgeon: Frank Chi MD;  Location: [facility] INVASIVE LOCATION;  Service: Cardiovascular;  Laterality: N/A;    OTHER SURGICAL HISTORY      PVA 2023 PER DR. CHI    TONSILLECTOMY      TRANSESOPHAGEAL ECHOCARDIOGRAM (GISSELL) AND CARDIOVERSION      x2    WISDOM TOOTH EXTRACTION         Family History:   Family History   Problem Relation Age of Onset    Prostate cancer Father     Diabetes Other        Social History:   Social History     Socioeconomic History    Marital status:    Tobacco Use    Smoking status: Never     Passive exposure: Past    Smokeless tobacco: Current     Types: Chew   Vaping Use    Vaping status: Never Used   Substance and Sexual Activity    Alcohol use: Yes     Comment: once or twice a month    Drug use: Never    Sexual activity: Defer       Allergies:   No Known Allergies    Objective     Physical Exam:  Vital Signs:   Vitals:    24 0914   BP: 128/82   BP Location: Right arm   Patient Position: Sitting   Cuff Size: Adult   Pulse: 87   Resp: 15   Temp: 97.8 °F (36.6 °C)   TempSrc: Oral   SpO2: 98%   Weight: 89.8 kg (198 lb)   Height: 182.9 cm (72\")     Body mass index is 26.85 " kg/m².        Physical Exam  Constitutional:       Appearance: Normal appearance.   Skin:     Comments: Left foot over the middle MTP joint plantar surface with a puncture wound no longer with purulent drainage but still very tender especially just lateral to the point of entry.  Patient was also examined by Dr. Green.   Neurological:      Mental Status: He is alert.         Foreign Body Removal    Date/Time: 6/17/2024 11:37 AM    Performed by: Sarah Rodriguez PA  Authorized by: Sarah Rodriguez PA  Body area: skin  General location: lower extremity  Location details: left foot  Anesthesia: local infiltration    Anesthesia:  Local Anesthetic: lidocaine 2% with epinephrine  Anesthetic total: 2 mL    Sedation:  Patient sedated: no    Patient restrained: no  Patient cooperative: yes  Localization method: probed  Removal mechanism: scalpel  Dressing: dressing applied  Tendon involvement: none  Depth: subcutaneous  Complexity: simple  1 objects recovered.  Objects recovered: wood splinter  Post-procedure assessment: foreign body removed  Patient tolerance: patient tolerated the procedure well with no immediate complications  Comments: 5 mm incision made adjacent to the puncture wound over the area of greatest tenderness.  A 2mm x 15mm wooden splinter was immediately located and able to be removed with forceps.  Patient on blood thinner so we did use lidocaine with epinephrine and the bleeding was minimal.  A pressure dressing was applied after the wound cavity was irrigated.          Assessment / Plan      Assessment/Plan:   Diagnoses and all orders for this visit:    1. Puncture wound of left foot, subsequent encounter (Primary)  Assessment & Plan:  X-ray results not yet reported but I will personally looked at the films on Friday, today is Monday, and did not see any evidence of a foreign body.  There was no purulent drainage but there was exquisite tenderness just lateral to the entrance wound and I was fairly certain  there was a retained foreign body prior to performing a procedure to remove.  Patient is both on Cipro and Bactrim.  I think he can take that antibiotics today and tomorrow then discontinue as there was no evidence of infection and the preliminary culture was no growth at 48 and 72 hours.  We discussed wound care.  I did recommend cleaning the wound with soap and water and doing some clean water and soap or Epsom salts soaks for the next few days.    Orders:  -     Foreign Body Removal    2. Foreign body in left foot, subsequent encounter  -     Foreign Body Removal         Medications:     Current Outpatient Medications:     acetaminophen (TYLENOL) 500 MG tablet, Take 1 tablet by mouth Every 6 (Six) Hours As Needed for Mild Pain., Disp: , Rfl:     ciprofloxacin (Cipro) 500 MG tablet, Take 1 tablet by mouth 2 (Two) Times a Day., Disp: 20 tablet, Rfl: 0    sotalol (BETAPACE) 160 MG tablet, Take 1 tablet by mouth 2 (Two) Times a Day., Disp: 60 tablet, Rfl: 5    sulfamethoxazole-trimethoprim (Bactrim DS) 800-160 MG per tablet, Take 1 tablet by mouth 2 (Two) Times a Day., Disp: 20 tablet, Rfl: 0    Xarelto 20 MG tablet, TAKE 1 TABLET BY MOUTH DAILY, Disp: 90 tablet, Rfl: 1        Follow Up:   No follow-ups on file.    Sarah Rodriugez PA-C   [facility]     NOTE TO PATIENT: The 21st Century Cures Act makes medical notes like these available to patients in the interest of transparency. However, be advised this is a medical document. It is intended as peer to peer communication. It is written in medical language and may contain abbreviations or verbiage that are unfamiliar. It may appear blunt or direct. Medical documents are intended to carry relevant information, facts as evident, and the clinical opinion of the practitioner.

## 2024-06-17 NOTE — ASSESSMENT & PLAN NOTE
X-ray results not yet reported but I will personally looked at the films on Friday, today is Monday, and did not see any evidence of a foreign body.  There was no purulent drainage but there was exquisite tenderness just lateral to the entrance wound and I was fairly certain there was a retained foreign body prior to performing a procedure to remove.  Patient is both on Cipro and Bactrim.  I think he can take that antibiotics today and tomorrow then discontinue as there was no evidence of infection and the preliminary culture was no growth at 48 and 72 hours.  We discussed wound care.  I did recommend cleaning the wound with soap and water and doing some clean water and soap or Epsom salts soaks for the next few days.

## 2024-06-17 NOTE — PAT
An arrival time for procedure was not provided during PAT visit. If patient had any questions or concerns about their arrival time, they were instructed to contact their surgeon/physician.  Additionally, if the patient referred to an arrival time that was acquired from their my chart account, patient was encouraged to verify that time with their surgeon/physician. Arrival times are NOT provided in Pre Admission Testing Department.    Patient denies any current skin issues.     Patient scheduled for CT at [facility] after Pre Admission Testing Appointment.     During PAT, pt stated that he has a puncture wound on the bottom of his left foot from a splinter. Splinter was removed at appointment prior to PAT today. See note in Epic. Pt is currently on two antibiotics. Per note from YOGI Silva, Dr. Chi's PA aware of situation.

## 2024-06-17 NOTE — PROGRESS NOTES
Called patient to discuss results, x-ray did not show any foreign body that was radiopaque of course we know that he had a splinter in his foot which we already pulled out and the culture grew bacillus species not bacillus) and this is generally a contaminant, probably from some dirt that was on his foot.

## 2024-06-18 ENCOUNTER — ANESTHESIA EVENT (OUTPATIENT)
Dept: CARDIOLOGY | Facility: HOSPITAL | Age: 64
End: 2024-06-18
Payer: COMMERCIAL

## 2024-06-19 ENCOUNTER — ANESTHESIA (OUTPATIENT)
Dept: CARDIOLOGY | Facility: HOSPITAL | Age: 64
End: 2024-06-19
Payer: COMMERCIAL

## 2024-06-19 ENCOUNTER — HOSPITAL ENCOUNTER (OUTPATIENT)
Facility: HOSPITAL | Age: 64
Setting detail: HOSPITAL OUTPATIENT SURGERY
Discharge: HOME | End: 2024-06-19
Attending: STUDENT IN AN ORGANIZED HEALTH CARE EDUCATION/TRAINING PROGRAM | Admitting: STUDENT IN AN ORGANIZED HEALTH CARE EDUCATION/TRAINING PROGRAM
Payer: COMMERCIAL

## 2024-06-19 VITALS
RESPIRATION RATE: 13 BRPM | HEART RATE: 65 BPM | TEMPERATURE: 97.7 F | SYSTOLIC BLOOD PRESSURE: 112 MMHG | OXYGEN SATURATION: 94 % | BODY MASS INDEX: 27.84 KG/M2 | HEIGHT: 71 IN | DIASTOLIC BLOOD PRESSURE: 78 MMHG | WEIGHT: 198.85 LBS

## 2024-06-19 DIAGNOSIS — I48.19 PERSISTENT ATRIAL FIBRILLATION: ICD-10-CM

## 2024-06-19 LAB
ACT BLD: 305 SECONDS (ref 82–152)
ACT BLD: 330 SECONDS (ref 82–152)
ACT BLD: 342 SECONDS (ref 82–152)
ACT BLD: 391 SECONDS (ref 82–152)
GLUCOSE BLDC GLUCOMTR-MCNC: 88 MG/DL (ref 70–130)

## 2024-06-19 PROCEDURE — C1894 INTRO/SHEATH, NON-LASER: HCPCS | Performed by: STUDENT IN AN ORGANIZED HEALTH CARE EDUCATION/TRAINING PROGRAM

## 2024-06-19 PROCEDURE — 25010000002 PROPOFOL 10 MG/ML EMULSION

## 2024-06-19 PROCEDURE — 25010000002 ADENOSINE PER 6 MG: Performed by: STUDENT IN AN ORGANIZED HEALTH CARE EDUCATION/TRAINING PROGRAM

## 2024-06-19 PROCEDURE — C1760 CLOSURE DEV, VASC: HCPCS | Performed by: STUDENT IN AN ORGANIZED HEALTH CARE EDUCATION/TRAINING PROGRAM

## 2024-06-19 PROCEDURE — C1893 INTRO/SHEATH, FIXED,NON-PEEL: HCPCS | Performed by: STUDENT IN AN ORGANIZED HEALTH CARE EDUCATION/TRAINING PROGRAM

## 2024-06-19 PROCEDURE — 25010000002 PROTAMINE SULFATE PER 10 MG: Performed by: STUDENT IN AN ORGANIZED HEALTH CARE EDUCATION/TRAINING PROGRAM

## 2024-06-19 PROCEDURE — 93656 COMPRE EP EVAL ABLTJ ATR FIB: CPT | Performed by: STUDENT IN AN ORGANIZED HEALTH CARE EDUCATION/TRAINING PROGRAM

## 2024-06-19 PROCEDURE — 93655 ICAR CATH ABLTJ DSCRT ARRHYT: CPT | Performed by: STUDENT IN AN ORGANIZED HEALTH CARE EDUCATION/TRAINING PROGRAM

## 2024-06-19 PROCEDURE — 25010000002 SUGAMMADEX 200 MG/2ML SOLUTION

## 2024-06-19 PROCEDURE — 25010000002 ONDANSETRON PER 1 MG

## 2024-06-19 PROCEDURE — C1730 CATH, EP, 19 OR FEW ELECT: HCPCS | Performed by: STUDENT IN AN ORGANIZED HEALTH CARE EDUCATION/TRAINING PROGRAM

## 2024-06-19 PROCEDURE — 25810000003 SODIUM CHLORIDE 0.9 % SOLUTION

## 2024-06-19 PROCEDURE — C1732 CATH, EP, DIAG/ABL, 3D/VECT: HCPCS | Performed by: STUDENT IN AN ORGANIZED HEALTH CARE EDUCATION/TRAINING PROGRAM

## 2024-06-19 PROCEDURE — 93623 PRGRMD STIMJ&PACG IV RX NFS: CPT | Performed by: STUDENT IN AN ORGANIZED HEALTH CARE EDUCATION/TRAINING PROGRAM

## 2024-06-19 PROCEDURE — 25010000002 HEPARIN (PORCINE) PER 1000 UNITS: Performed by: STUDENT IN AN ORGANIZED HEALTH CARE EDUCATION/TRAINING PROGRAM

## 2024-06-19 PROCEDURE — 25810000003 SODIUM CHLORIDE 0.9 % SOLUTION: Performed by: STUDENT IN AN ORGANIZED HEALTH CARE EDUCATION/TRAINING PROGRAM

## 2024-06-19 PROCEDURE — C1759 CATH, INTRA ECHOCARDIOGRAPHY: HCPCS | Performed by: STUDENT IN AN ORGANIZED HEALTH CARE EDUCATION/TRAINING PROGRAM

## 2024-06-19 PROCEDURE — 25010000002 DEXAMETHASONE PER 1 MG

## 2024-06-19 PROCEDURE — 25010000002 LIDOCAINE 1 % SOLUTION

## 2024-06-19 PROCEDURE — 85347 COAGULATION TIME ACTIVATED: CPT

## 2024-06-19 PROCEDURE — 25810000003 SODIUM CHLORIDE 0.9 % SOLUTION 250 ML FLEX CONT

## 2024-06-19 PROCEDURE — 82948 REAGENT STRIP/BLOOD GLUCOSE: CPT

## 2024-06-19 PROCEDURE — 25010000002 PHENYLEPHRINE 10 MG/ML SOLUTION 1 ML VIAL

## 2024-06-19 RX ORDER — MIDAZOLAM HYDROCHLORIDE 1 MG/ML
1 INJECTION INTRAMUSCULAR; INTRAVENOUS
Status: DISCONTINUED | OUTPATIENT
Start: 2024-06-19 | End: 2024-06-19 | Stop reason: HOSPADM

## 2024-06-19 RX ORDER — LIDOCAINE HYDROCHLORIDE 10 MG/ML
INJECTION, SOLUTION INFILTRATION; PERINEURAL AS NEEDED
Status: DISCONTINUED | OUTPATIENT
Start: 2024-06-19 | End: 2024-06-19 | Stop reason: SURG

## 2024-06-19 RX ORDER — ADENOSINE 3 MG/ML
INJECTION, SOLUTION INTRAVENOUS
Status: DISCONTINUED | OUTPATIENT
Start: 2024-06-19 | End: 2024-06-19 | Stop reason: HOSPADM

## 2024-06-19 RX ORDER — HEPARIN SODIUM 10000 [USP'U]/100ML
INJECTION, SOLUTION INTRAVENOUS
Status: COMPLETED | OUTPATIENT
Start: 2024-06-19 | End: 2024-06-19

## 2024-06-19 RX ORDER — SODIUM CHLORIDE 9 MG/ML
INJECTION, SOLUTION INTRAVENOUS CONTINUOUS PRN
Status: DISCONTINUED | OUTPATIENT
Start: 2024-06-19 | End: 2024-06-19 | Stop reason: SURG

## 2024-06-19 RX ORDER — SODIUM CHLORIDE 0.9 % (FLUSH) 0.9 %
3 SYRINGE (ML) INJECTION EVERY 12 HOURS SCHEDULED
Status: DISCONTINUED | OUTPATIENT
Start: 2024-06-19 | End: 2024-06-19 | Stop reason: HOSPADM

## 2024-06-19 RX ORDER — SODIUM CHLORIDE 9 MG/ML
40 INJECTION, SOLUTION INTRAVENOUS AS NEEDED
Status: DISCONTINUED | OUTPATIENT
Start: 2024-06-19 | End: 2024-06-19 | Stop reason: HOSPADM

## 2024-06-19 RX ORDER — LIDOCAINE HYDROCHLORIDE 10 MG/ML
0.5 INJECTION, SOLUTION EPIDURAL; INFILTRATION; INTRACAUDAL; PERINEURAL ONCE AS NEEDED
Status: DISCONTINUED | OUTPATIENT
Start: 2024-06-19 | End: 2024-06-19 | Stop reason: HOSPADM

## 2024-06-19 RX ORDER — PROTAMINE SULFATE 10 MG/ML
INJECTION, SOLUTION INTRAVENOUS
Status: DISCONTINUED | OUTPATIENT
Start: 2024-06-19 | End: 2024-06-19 | Stop reason: HOSPADM

## 2024-06-19 RX ORDER — SODIUM CHLORIDE, SODIUM LACTATE, POTASSIUM CHLORIDE, CALCIUM CHLORIDE 600; 310; 30; 20 MG/100ML; MG/100ML; MG/100ML; MG/100ML
9 INJECTION, SOLUTION INTRAVENOUS CONTINUOUS
Status: DISCONTINUED | OUTPATIENT
Start: 2024-06-19 | End: 2024-06-19 | Stop reason: HOSPADM

## 2024-06-19 RX ORDER — ACETAMINOPHEN 325 MG/1
650 TABLET ORAL EVERY 4 HOURS PRN
Status: DISCONTINUED | OUTPATIENT
Start: 2024-06-19 | End: 2024-06-19 | Stop reason: HOSPADM

## 2024-06-19 RX ORDER — ONDANSETRON 2 MG/ML
4 INJECTION INTRAMUSCULAR; INTRAVENOUS EVERY 6 HOURS PRN
Status: DISCONTINUED | OUTPATIENT
Start: 2024-06-19 | End: 2024-06-19 | Stop reason: HOSPADM

## 2024-06-19 RX ORDER — DEXAMETHASONE SODIUM PHOSPHATE 4 MG/ML
INJECTION, SOLUTION INTRA-ARTICULAR; INTRALESIONAL; INTRAMUSCULAR; INTRAVENOUS; SOFT TISSUE AS NEEDED
Status: DISCONTINUED | OUTPATIENT
Start: 2024-06-19 | End: 2024-06-19 | Stop reason: SURG

## 2024-06-19 RX ORDER — SODIUM CHLORIDE 9 MG/ML
INJECTION, SOLUTION INTRAVENOUS
Status: COMPLETED | OUTPATIENT
Start: 2024-06-19 | End: 2024-06-19

## 2024-06-19 RX ORDER — FAMOTIDINE 10 MG/ML
20 INJECTION, SOLUTION INTRAVENOUS ONCE
Status: COMPLETED | OUTPATIENT
Start: 2024-06-19 | End: 2024-06-19

## 2024-06-19 RX ORDER — ROCURONIUM BROMIDE 10 MG/ML
INJECTION, SOLUTION INTRAVENOUS AS NEEDED
Status: DISCONTINUED | OUTPATIENT
Start: 2024-06-19 | End: 2024-06-19 | Stop reason: SURG

## 2024-06-19 RX ORDER — PROPOFOL 10 MG/ML
VIAL (ML) INTRAVENOUS AS NEEDED
Status: DISCONTINUED | OUTPATIENT
Start: 2024-06-19 | End: 2024-06-19 | Stop reason: SURG

## 2024-06-19 RX ORDER — ACETAMINOPHEN 650 MG/1
650 SUPPOSITORY RECTAL EVERY 4 HOURS PRN
Status: DISCONTINUED | OUTPATIENT
Start: 2024-06-19 | End: 2024-06-19 | Stop reason: HOSPADM

## 2024-06-19 RX ORDER — FAMOTIDINE 20 MG/1
20 TABLET, FILM COATED ORAL ONCE
Status: DISCONTINUED | OUTPATIENT
Start: 2024-06-19 | End: 2024-06-19 | Stop reason: HOSPADM

## 2024-06-19 RX ORDER — HEPARIN SODIUM 1000 [USP'U]/ML
INJECTION, SOLUTION INTRAVENOUS; SUBCUTANEOUS
Status: DISCONTINUED | OUTPATIENT
Start: 2024-06-19 | End: 2024-06-19 | Stop reason: HOSPADM

## 2024-06-19 RX ORDER — SODIUM CHLORIDE 0.9 % (FLUSH) 0.9 %
10 SYRINGE (ML) INJECTION AS NEEDED
Status: DISCONTINUED | OUTPATIENT
Start: 2024-06-19 | End: 2024-06-19 | Stop reason: HOSPADM

## 2024-06-19 RX ORDER — SODIUM CHLORIDE 0.9 % (FLUSH) 0.9 %
10 SYRINGE (ML) INJECTION EVERY 12 HOURS SCHEDULED
Status: DISCONTINUED | OUTPATIENT
Start: 2024-06-19 | End: 2024-06-19 | Stop reason: HOSPADM

## 2024-06-19 RX ORDER — IBUPROFEN 200 MG
400 TABLET ORAL EVERY 6 HOURS PRN
Status: DISCONTINUED | OUTPATIENT
Start: 2024-06-19 | End: 2024-06-19 | Stop reason: HOSPADM

## 2024-06-19 RX ORDER — ONDANSETRON 2 MG/ML
INJECTION INTRAMUSCULAR; INTRAVENOUS AS NEEDED
Status: DISCONTINUED | OUTPATIENT
Start: 2024-06-19 | End: 2024-06-19 | Stop reason: SURG

## 2024-06-19 RX ORDER — NITROGLYCERIN 0.4 MG/1
0.4 TABLET SUBLINGUAL
Status: DISCONTINUED | OUTPATIENT
Start: 2024-06-19 | End: 2024-06-19 | Stop reason: HOSPADM

## 2024-06-19 RX ADMIN — ROCURONIUM BROMIDE 50 MG: 10 SOLUTION INTRAVENOUS at 07:43

## 2024-06-19 RX ADMIN — LIDOCAINE HYDROCHLORIDE 50 MG: 10 INJECTION, SOLUTION INFILTRATION; PERINEURAL at 07:43

## 2024-06-19 RX ADMIN — ROCURONIUM BROMIDE 20 MG: 10 SOLUTION INTRAVENOUS at 09:11

## 2024-06-19 RX ADMIN — FAMOTIDINE 20 MG: 10 INJECTION, SOLUTION INTRAVENOUS at 06:54

## 2024-06-19 RX ADMIN — PHENYLEPHRINE HYDROCHLORIDE 1 MCG/KG/MIN: 10 INJECTION INTRAVENOUS at 08:00

## 2024-06-19 RX ADMIN — ONDANSETRON 4 MG: 2 INJECTION INTRAMUSCULAR; INTRAVENOUS at 10:22

## 2024-06-19 RX ADMIN — ROCURONIUM BROMIDE 10 MG: 10 SOLUTION INTRAVENOUS at 09:16

## 2024-06-19 RX ADMIN — ACETAMINOPHEN 650 MG: 325 TABLET ORAL at 13:34

## 2024-06-19 RX ADMIN — PROPOFOL 150 MG: 10 INJECTION, EMULSION INTRAVENOUS at 07:43

## 2024-06-19 RX ADMIN — ROCURONIUM BROMIDE 20 MG: 10 SOLUTION INTRAVENOUS at 09:57

## 2024-06-19 RX ADMIN — SODIUM CHLORIDE: 9 INJECTION, SOLUTION INTRAVENOUS at 07:39

## 2024-06-19 RX ADMIN — DEXAMETHASONE SODIUM PHOSPHATE 4 MG: 4 INJECTION, SOLUTION INTRAMUSCULAR; INTRAVENOUS at 07:55

## 2024-06-19 RX ADMIN — SUGAMMADEX 300 MG: 100 INJECTION, SOLUTION INTRAVENOUS at 10:12

## 2024-06-19 NOTE — ANESTHESIA PROCEDURE NOTES
Airway  Urgency: elective    Date/Time: 6/19/2024 7:45 AM  Airway not difficult    General Information and Staff    Patient location during procedure: OR  CRNA/CAA: Salena Rea CRNA    Indications and Patient Condition  Indications for airway management: airway protection    Preoxygenated: yes  MILS not maintained throughout  Mask difficulty assessment: 1 - vent by mask    Final Airway Details  Final airway type: endotracheal airway      Successful airway: ETT  Cuffed: yes   Successful intubation technique: video laryngoscopy  Facilitating devices/methods: intubating stylet  Endotracheal tube insertion site: oral  Blade: Mcneil  Blade size: 3  ETT size (mm): 7.5  Cormack-Lehane Classification: grade I - full view of glottis  Placement verified by: chest auscultation and capnometry   Measured from: lips  ETT/EBT  to lips (cm): 21  Number of attempts at approach: 1  Assessment: lips, teeth, and gum same as pre-op and atraumatic intubation    Additional Comments  Negative epigastric sounds, Breath sound equal bilaterally with symmetric chest rise and fall    Elective use of mcneil

## 2024-06-19 NOTE — ANESTHESIA POSTPROCEDURE EVALUATION
Patient: Reed Clarke Jr.    Procedure Summary       Date: 06/19/24 Room / Location: [facility] CATH/EP LAB G / [facility] INVASIVE LOCATION    Anesthesia Start: 0730 Anesthesia Stop: 1037    Procedure: Ablation atrial fibrillation. Hold Sotalol 5 days prior and Xarelto the night before the procedure. Diagnosis:       Persistent atrial fibrillation      (Afib)    Providers: Frank Chi MD Provider: David Cordova MD    Anesthesia Type: general ASA Status: 3            Anesthesia Type: general    Vitals  Vitals Value Taken Time   /85 06/19/24 1040   Temp 97.7 °F (36.5 °C) 06/19/24 1040   Pulse 82 06/19/24 1040   Resp 11 06/19/24 1040   SpO2 98 % 06/19/24 1040           Post Anesthesia Care and Evaluation    Patient location during evaluation: PACU  Patient participation: complete - patient participated  Level of consciousness: awake and alert  Pain score: 0  Pain management: adequate    Airway patency: patent  Anesthetic complications: No anesthetic complications  PONV Status: none  Cardiovascular status: hemodynamically stable and acceptable  Respiratory status: nonlabored ventilation, acceptable and nasal cannula  Hydration status: acceptable

## 2024-06-19 NOTE — H&P
Pre-cardiac Ablation History and Physical  [facility]      Patient:  Reed Clarke Jr.  :  1960  MRN: [id number]    PCP:  Héctor Kahn MD  PHONE:  [phone]    DATE: 2024  ID: Reed Clarke Jr. is a 63 y.o. male from [place]    CC: atrial fibrillation, palpitations    Problem List:  Persistent atrial fibrillation  2023 PVA, left atrial roofline creation and CTI dependent atrial flutter ablation by Dr. Chi  Symptomatic A-fib with RVR recurrence 2024 redo ablation  Presumed tachycardia mediated NICM with recovered LVEF  TTE 2022: LVEF 61-65%, grade 2 diastolic dysfunction, mild MR, moderate TR, RVSP 45-55  OZ on CPAP        BRIEF HPI: Mr. Clarke is a 63-year-old male with a history of atrial fibrillation s/p ablation in 2023 consisting of pulmonary vein isolation, left atrial roofline creation typical CTI dependent atrial flutter ablation.  He presents today for redo PVA.  He unfortunately has had several recurrences of atrial fibrillation.  He was restarted on sotalol and eventually increase back to 160 mg twice daily.  This does seem to be helping but he does have some breakthrough episodes of atrial fibrillation still.  He feels as if he is having 2-3 episodes a week.  Since we last saw the patient in April, he has had a couple of symptomatic recurrences.  He also had a splinter in his foot with possible infection that is healing well.  He received antibiotics but the wound culture was negative.      Allergies:      No Known Allergies    MEDICATIONS:  Current Outpatient Medications   Medication Instructions    acetaminophen (TYLENOL) 500 mg, Oral, Every 6 Hours PRN    ciprofloxacin (CIPRO) 500 mg, Oral, 2 Times Daily    sotalol (BETAPACE) 160 mg, Oral, 2 Times Daily    sulfamethoxazole-trimethoprim (Bactrim DS) 800-160 MG per tablet 1 tablet, Oral, 2 Times Daily    Xarelto 20 mg, Oral, Daily       Past medical & surgical history,  "social and family history reviewed in the electronic medical record.    ROS: Pertinent positives listed in the HPI and problem list above. All others reviewed and negative.     Physical Exam:   /67 (BP Location: Left arm, Patient Position: Lying)   Pulse 99   Temp 97.5 °F (36.4 °C) (Temporal)   Resp 18   Ht 180.3 cm (71\")   Wt 90.2 kg (198 lb 13.6 oz)   SpO2 94%   BMI 27.73 kg/m²     Constitutional:    Well-appearing 63 y.o. y/o adult  in no acute distress        Heart:    Regular rhythm and normal rate, no murmurs, rubs or gallops   Lungs:     Clear to auscultation bilaterally, no wheezes, rhales or rhonchi, nonlabored respirations       Extremities:   No gross deformities, no edema, clubbing, or cyanosis.    Pulses:    Neuro:  Psych:   Radial pulses palpable and equal bilaterally.  No gross focal deficits  Mood and behavior appropriate for situation     Labs and Diagnostic Data:  Lab Results   Component Value Date    GLUCOSE 102 (H) 06/17/2024    BUN 16 06/17/2024    CREATININE 1.13 06/17/2024    EGFRRESULT 90 09/08/2022    EGFR 73.0 06/17/2024    BCR 14.2 06/17/2024    K 4.8 06/17/2024    CO2 27.0 06/17/2024    CALCIUM 9.5 06/17/2024    PROTENTOTREF 7.1 09/08/2022    ALBUMIN 4.3 09/08/2022    BILITOT 0.3 09/08/2022    AST 17 09/08/2022    ALT 16 09/08/2022     Lab Results   Component Value Date    WBC 7.02 06/17/2024    HGB 15.5 06/17/2024    HCT 48.5 06/17/2024    MCV 92.4 06/17/2024     06/17/2024     Lab Results   Component Value Date    TSH 1.150 06/17/2024     No results found for: \"HGBA1C\"  No results found for: \"CHOL\", \"CHLPL\", \"TRIG\", \"HDL\", \"LDL\", \"LDLDIRECT\"      Tele: NSR    IMPRESSION:  Mr. Clarke is a 63-year-old male history of atrial fibrillation s/p ablation in September 2023 consisting of pulmonary vein isolation, left atrial roofline creation and typical CTI dependent atrial flutter ablation who presents today for redo PVA.  GIA sotalol 6/12/2024  GIA blood     PLAN:  Procedure " to perform: PVA. Risks, benefits and alternatives to the procedure explained to the patient and he understands and wishes to proceed.     Electronically signed by Tyrone Teague PA-C, 06/19/24, 7:18 AM EDT.

## 2024-06-19 NOTE — ANESTHESIA PREPROCEDURE EVALUATION
Anesthesia Evaluation     Patient summary reviewed and Nursing notes reviewed   NPO Solid Status: > 8 hours  NPO Liquid Status: > 2 hours           Airway   Mallampati: II  TM distance: >3 FB  Neck ROM: full  No difficulty expected  Dental      Pulmonary    (+) ,sleep apnea on CPAP  (-) shortness of breath, recent URI, not a smoker  Cardiovascular     ECG reviewed  PT is on anticoagulation therapy  Patient on routine beta blocker    (+) dysrhythmias Atrial Fib  (-) hypertension, CAD, angina, cardiac stents, CABG    ROS comment: ECG A Fib  ECHO 2022 EF >61 %. LVDD  (grade II w/high LAP) pseudonormalization.  LV mass index is increased.  LA volume is mildly increased.RA dilated   MITRAL Moderate valve thickening Mild MR   AORTIC sclerosis   TI Moderate RVSP moderately elevated (45-55 mmHg).RV dilated             Neuro/Psych  (-) seizures, CVA  GI/Hepatic/Renal/Endo    (-) no renal disease, diabetes, no thyroid disorder    Musculoskeletal     Abdominal    Substance History      OB/GYN          Other            Phys Exam Other: McG 4 G1 V                 Anesthesia Plan    ASA 3     general     (PFL )  intravenous induction     Anesthetic plan, risks, benefits, and alternatives have been provided, discussed and informed consent has been obtained with: patient.    Plan discussed with CRNA.        CODE STATUS:

## 2024-06-19 NOTE — Clinical Note
Hemostasis started on the left femoral vein. Vascade MVP was used in achieving hemostasis. Closure device deployed in the vessel. Hemostasis achieved successfully. Closure device additional comment: X 2

## 2024-06-20 ENCOUNTER — CALL CENTER PROGRAMS (OUTPATIENT)
Dept: CALL CENTER | Facility: HOSPITAL | Age: 64
End: 2024-06-20
Payer: COMMERCIAL

## 2024-06-20 NOTE — OUTREACH NOTE
PCI/Device Survey      Flowsheet Row Responses   Facility patient discharged from? [facility]   Procedure date 06/19/24   Procedure (if device, specify in description) Ablation   Performing MD Dr. Frank Chi   Attempt successful? Yes   Call start time 1452   Call end time 1458   Has the patient had any of the following symptoms since discharge? --  [Denies any symptoms.]   Is the patient taking prescribed medications: --  [Xarelto]   Nursing intervention Reminded to continue to take prescribed medications   Medication comments Sotalol dc'd   Does the patient have any of the following symptoms related to the cath/surgical site? --  [Dressing currently clean and intact. Reports that patient is getting ready to shower and remove dressings.]   Nursing intervention Patient education provided   Does the patient have an appointment scheduled with the cardiologist? Yes   Appointment comments Follow Up with Ji Sahu  Monday Jul 22, 2024 1:30 PM  -Hospital Follow Up with Frank Chi  Thursday Sep 26, 2024 10:00 AM   If the patient is a current smoker, are they able to teach back resources for cessation? Not a smoker   Did the patient feel prepared to go home on the same day as the procedure? Yes   Is the patient satisfied with the same day discharge process? Yes   PCI/Device call completed Yes   Wrap up additional comments Denies any further questions or needs.            ISAURA DOUGLAS - Registered Nurse

## 2024-07-22 ENCOUNTER — OFFICE VISIT (OUTPATIENT)
Dept: CARDIOLOGY | Facility: HOSPITAL | Age: 64
End: 2024-07-22
Payer: COMMERCIAL

## 2024-07-22 VITALS
HEIGHT: 72 IN | RESPIRATION RATE: 20 BRPM | SYSTOLIC BLOOD PRESSURE: 127 MMHG | HEART RATE: 76 BPM | WEIGHT: 205.2 LBS | DIASTOLIC BLOOD PRESSURE: 73 MMHG | OXYGEN SATURATION: 98 % | TEMPERATURE: 98.1 F | BODY MASS INDEX: 27.79 KG/M2

## 2024-07-22 DIAGNOSIS — Z86.79 HISTORY OF CARDIOMYOPATHY: ICD-10-CM

## 2024-07-22 DIAGNOSIS — G47.33 OBSTRUCTIVE SLEEP APNEA ON CPAP: ICD-10-CM

## 2024-07-22 DIAGNOSIS — I48.19 PERSISTENT ATRIAL FIBRILLATION: Primary | ICD-10-CM

## 2024-07-22 PROCEDURE — 99214 OFFICE O/P EST MOD 30 MIN: CPT | Performed by: NURSE PRACTITIONER

## 2024-07-22 NOTE — PROGRESS NOTES
"Chief Complaint  Atrial Fibrillation (ablation follow-up)    Subjective      History of Present Illness {CC  Problem List  Visit  Diagnosis   Encounters  Notes  Medications  Labs  Result Review Imaging  Media :23}     Reed Clarke Jr., 63 y.o. male with persistent AF s/p ablation, tachycardia CMOP, OZ on CPAP  presents to [facility] for Atrial Fibrillation (ablation follow-up)    Patient recently underwent successful redo PVI, CTI, left atrial roofline AF ablation by Dr. Chi 6/19/2024. Sotalol was discontinued prior to procedure.     Patient presents today doing well since their procedure.  States intermittent short recurrence of arrhythmia symptoms but overall arrhythmia symptoms improved after ablation; denies access site complications.  Denies chest pain, dyspnea, near syncope/syncope.  He has continued uninterrupted anticoagulation as instructed.      Objective     Vital Signs:   Vitals:    07/22/24 1321 07/22/24 1322   BP: 132/80 127/73   BP Location: Left arm Left arm   Patient Position: Standing Sitting   Cuff Size: Adult Adult   Pulse: 78 76   Resp:  20   Temp: 98.1 °F (36.7 °C) 98.1 °F (36.7 °C)   TempSrc: Temporal Temporal   SpO2: 98% 98%   Weight:  93.1 kg (205 lb 3.2 oz)   Height:  182.9 cm (72\")     Body mass index is 27.83 kg/m².  Physical Exam  Vitals and nursing note reviewed.   Constitutional:       Appearance: Normal appearance.   HENT:      Head: Normocephalic.   Eyes:      Extraocular Movements: Extraocular movements intact.   Neck:      Vascular: No carotid bruit.   Cardiovascular:      Rate and Rhythm: Normal rate and regular rhythm.      Pulses: Normal pulses.      Heart sounds: Normal heart sounds, S1 normal and S2 normal. No murmur heard.  Pulmonary:      Effort: Pulmonary effort is normal. No respiratory distress.      Breath sounds: Normal breath sounds.   Musculoskeletal:      Cervical back: Neck supple.      Right lower leg: No edema.      Left " lower leg: No edema.   Skin:     General: Skin is warm and dry.   Neurological:      General: No focal deficit present.      Mental Status: He is alert.   Psychiatric:         Mood and Affect: Mood normal.         Behavior: Behavior normal.         Thought Content: Thought content normal.        Data Reviewed:{ Labs  Result Review  Imaging  Med Tab  Media :23}     EP/CRM Study (06/19/2024 10:26)   TSH Rfx On Abnormal To Free T4 (06/17/2024 12:27)  Basic Metabolic Panel (06/17/2024 12:27)  CBC (No Diff) (06/17/2024 12:27)      Assessment & Plan   Assessment and Plan {CC Problem List  Visit Diagnosis  ROS  Review (Popup)  Health Maintenance  Quality  BestPractice  Medications  SmartSets  SnapShot Encounters  Media :23}     1. Persistent atrial fibrillation  -S/p recent redo AF ablation with Dr. Chi.  Previously maintained on sotalol prior to ablation.  -Arrhythmia symptoms improved since ablation  -Regular rate/rhythm at time of exam  -[id number]: 0  -Continue anticoagulation with Xarelto 20 Mg daily with dinner  -Continue routine EP follow-up as scheduled    2. History of cardiomyopathy  -Previous presumed tachycardia mediated NICM with LVEF recovered  -TTE September 2022 with EF 61-65%.  -Appears well compensated/euvolemic on exam  -Continue AF treatment as above    3. Obstructive sleep apnea on CPAP  -Compliant with CPAP use      Follow Up {Instructions Charge Capture  Follow-up Communications :23}     Return if symptoms worsen or fail to improve.      Patient was given instructions and counseling regarding his condition or for health maintenance advice. Please see specific information pulled into the AVS if appropriate.  Patient was instructed to call the [facility] with any questions, concerns, or worsening symptoms.    Dictated Utilizing Dragon Dictation   Please note that portions of this note were completed with a voice recognition program.   Part of this note may be an  electronic transcription/translation of spoken language to printed text using the Dragon Dictation System.

## 2024-09-11 ENCOUNTER — TELEPHONE (OUTPATIENT)
Dept: CARDIOLOGY | Facility: CLINIC | Age: 64
End: 2024-09-11
Payer: COMMERCIAL

## 2024-09-26 ENCOUNTER — OFFICE VISIT (OUTPATIENT)
Dept: CARDIOLOGY | Facility: CLINIC | Age: 64
End: 2024-09-26
Payer: COMMERCIAL

## 2024-09-26 VITALS
SYSTOLIC BLOOD PRESSURE: 124 MMHG | OXYGEN SATURATION: 99 % | WEIGHT: 206.6 LBS | HEART RATE: 76 BPM | BODY MASS INDEX: 28.92 KG/M2 | DIASTOLIC BLOOD PRESSURE: 80 MMHG | HEIGHT: 71 IN

## 2024-09-26 DIAGNOSIS — Z86.79 HISTORY OF CARDIOMYOPATHY: ICD-10-CM

## 2024-09-26 DIAGNOSIS — I48.19 PERSISTENT ATRIAL FIBRILLATION: Primary | ICD-10-CM

## 2024-09-26 PROCEDURE — 99214 OFFICE O/P EST MOD 30 MIN: CPT | Performed by: STUDENT IN AN ORGANIZED HEALTH CARE EDUCATION/TRAINING PROGRAM

## 2024-10-01 NOTE — PROGRESS NOTES
Cardiac Electrophysiology Outpatient Note  [facility] at [facility]    Office Visit     Reed Clarke Jr.  [id number]  01/25/2024    Primary Care Physician: Héctor Kahn MD    Referred By: No ref. provider found    Subjective     Chief Complaint   Patient presents with    Persistent atrial fibrillation       History of Present Illness:   Reed Clarke Jr. is a 64 y.o. male who presents to my electrophysiology clinic for follow up of atrial fibrillation status post ablation in September 2023.  This consisted of pulmonary vein isolation, left atrial roofline creation and ablation of typical CTI dependent atrial flutter.  Unfortunately, pulmonary vein isolation was quite difficult.  He has since had several recurrences of atrial fibrillation.  He was restarted on sotalol and eventually increased back up to 160 mg twice daily.  This does seem to be helping, but he does continue to have episodes of atrial fibrillation.  Feels as though he is pretty frequently going in and out of A-fib.  Has episodes at least 2-3 times per week.    Past Medical History:   Diagnosis Date    BMI 27.0-27.9,adult     Mild sleep apnea     cpap compliant, PS 8    Paroxysmal atrial fibrillation     Secondary cardiomyopathy        Past Surgical History:   Procedure Laterality Date    CARDIAC ELECTROPHYSIOLOGY PROCEDURE N/A 9/22/2023    Procedure: PVA, paroxysmal, hold Sotalol 4 days and Eliquis 1 day;  Surgeon: Frank Chi MD;  Location: [facility] EP INVASIVE LOCATION;  Service: Cardiovascular;  Laterality: N/A;    CARDIAC ELECTROPHYSIOLOGY PROCEDURE N/A 6/19/2024    Procedure: Ablation atrial fibrillation. Hold Sotalol 5 days prior and Xarelto the night before the procedure.;  Surgeon: Frank Chi MD;  Location: [facility] EP INVASIVE LOCATION;  Service: Cardiovascular;  Laterality: N/A;    OTHER SURGICAL HISTORY      PVA 09/22/2023 PER DR. CHI    TONSILLECTOMY      TRANSESOPHAGEAL ECHOCARDIOGRAM (GISSELL) AND  "CARDIOVERSION      x2    WISDOM TOOTH EXTRACTION         Family History   Problem Relation Age of Onset    No Known Problems Mother     Prostate cancer Father     No Known Problems Sister     Cancer Maternal Grandmother     Heart attack Maternal Grandfather     Arrhythmia Paternal Grandfather         pacemaker    Diabetes Other        Social History     Socioeconomic History    Marital status:    Tobacco Use    Smoking status: Never     Passive exposure: Past    Smokeless tobacco: Current     Types: Chew   Vaping Use    Vaping status: Never Used   Substance and Sexual Activity    Alcohol use: Yes     Comment: once or twice a month, social    Drug use: Never    Sexual activity: Defer         Current Outpatient Medications:     acetaminophen (TYLENOL) 500 MG tablet, Take 1 tablet by mouth Every 6 (Six) Hours As Needed for Mild Pain., Disp: , Rfl:     Xarelto 20 MG tablet, TAKE 1 TABLET BY MOUTH DAILY, Disp: 90 tablet, Rfl: 1    Allergies:   No Known Allergies    Objective   Vital Signs: Blood pressure 124/80, pulse 76, height 180.3 cm (71\"), weight 93.7 kg (206 lb 9.6 oz), SpO2 99%.    PHYSICAL EXAM  General appearance: Awake, alert, cooperative  Head: Normocephalic, without obvious abnormality, atraumatic  Neck: No JVD  Lungs: Clear to ascultation bilaterally  Heart: Regular rate and rhythm, no murmurs, 2+ LE pulses, no lower extremity swelling  Skin: Skin color, turgor normal, no rashes or lesions  Neurologic: Grossly normal     Lab Results   Component Value Date    GLUCOSE 102 (H) 06/17/2024    CALCIUM 9.5 06/17/2024     06/17/2024    K 4.8 06/17/2024    CO2 27.0 06/17/2024     06/17/2024    BUN 16 06/17/2024    CREATININE 1.13 06/17/2024    BCR 14.2 06/17/2024    ANIONGAP 10.0 06/17/2024     Lab Results   Component Value Date    WBC 7.02 06/17/2024    HGB 15.5 06/17/2024    HCT 48.5 06/17/2024    MCV 92.4 06/17/2024     06/17/2024     No results found for: \"INR\", \"PROTIME\"  Lab Results "   Component Value Date    TSH 1.150 06/17/2024          Results for orders placed in visit on 09/06/22    Adult Transthoracic Echo Complete W/ Cont if Necessary Per Protocol    Interpretation Summary  · Estimated left ventricular EF = 62% Estimated left ventricular EF was in agreement with the calculated left ventricular EF. Left ventricular ejection fraction appears to be 61 - 65%. Left ventricular systolic function is normal.  · Left ventricular diastolic function is consistent with (grade II w/high LAP) pseudonormalization.  · Left ventricular mass index is increased.  · Left atrial volume is mildly increased.  · The right atrial cavity is mildly dilated.  · There is moderate, bileaflet mitral valve thickening present.  · Mild mitral valve regurgitation is present.  · Moderate tricuspid valve regurgitation is present.  · Estimated right ventricular systolic pressure from tricuspid regurgitation is moderately elevated (45-55 mmHg).  · The right ventricular cavity is mildly dilated.  · Mild aortic sclerosis  · Moderate TR, Mild MR.  · No pericardial effusion         I personally viewed and interpreted the patient's EKG/Telemetry/lab data    Procedures    EKG shows sinus bradycardia without other abnormalities    Reed Clarke Jr.  reports that he has never smoked. He has been exposed to tobacco smoke. His smokeless tobacco use includes chew.. I    Assessment & Plan    1. Persistent atrial fibrillation  He has a history of atrial fibrillation status post ablation in September 2023.  This consisted of pulmonary vein isolation, left atrial roofline creation and ablation of typical CTI dependent atrial flutter.  Unfortunately, pulmonary vein isolation was quite difficult.  He underwent redo ablation with touchup of the pulmonary veins, roofline, and CTI line.  He is done quite well since then without significant atrial fibrillation, outside of the initial blanking period.  Will plan to continue Xarelto.  Continue to  monitor going forward.    2. History of cardiomyopathy  He has a history of nonischemic cardiomyopathy with since normalized ejection fraction.  Continues to follow with Dr. Hernández.  Appears well compensated currently.    3. Pulmonary nodule  He was found to have incidental pulmonary nodule consolidation on his preablation CT. most recent CT in January 2024 showed the right upper lobe airspace disease had resolved and there was small nodule in the right upper lobe.  They recommended repeat follow-up in 1 year.  Will plan for this in January 2025.    4. Pulmonary HTN  Last echo shows moderate pulmonary hypertension with an RVSP estimated 45 to 55 mmHg.  This was in 2022.  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  Depending on symptoms going forward could repeat echo and consider additional workup for pulmonary hypertension.    5. SOB (shortness of breath) on exertion  Overall not too bad currently.  Will continue to monitor.    6.  Sinus node dysfunction  He has evidence of mild sinus node dysfunction with bradycardia.  Normal heart rate today.  Will continue to monitor.    Follow Up:  Return for Next scheduled follow up.      Thank you for allowing me to participate in the care of your patient. Please do not hesitate to contact me with additional questions or concerns.      Frank Chi M.D.  Cardiac Electrophysiologist  [facility] / [facility]

## 2024-10-03 ENCOUNTER — OFFICE VISIT (OUTPATIENT)
Dept: CARDIOLOGY | Facility: CLINIC | Age: 64
End: 2024-10-03
Payer: COMMERCIAL

## 2024-10-03 VITALS
RESPIRATION RATE: 18 BRPM | DIASTOLIC BLOOD PRESSURE: 80 MMHG | HEART RATE: 80 BPM | HEIGHT: 71 IN | TEMPERATURE: 97 F | SYSTOLIC BLOOD PRESSURE: 122 MMHG | OXYGEN SATURATION: 99 % | WEIGHT: 207 LBS | BODY MASS INDEX: 28.98 KG/M2

## 2024-10-03 DIAGNOSIS — G47.33 OBSTRUCTIVE SLEEP APNEA ON CPAP: ICD-10-CM

## 2024-10-03 DIAGNOSIS — Z86.79 HISTORY OF CARDIOMYOPATHY: Primary | ICD-10-CM

## 2024-10-03 DIAGNOSIS — I48.19 PERSISTENT ATRIAL FIBRILLATION: ICD-10-CM

## 2024-10-03 NOTE — PROGRESS NOTES
[facility]  [facility]  [address] [place] [address]  Dept: [phone]  Dept Fax: [phone]    Reed Clarke Jr.  1960    Follow Up Office Visit Note    History of Present Illness:  Reed Clarke Jr. is a 64 y.o. male who presents to the clinic for Follow-up.  Persistent atrial fibrillation- He underwent ablation in 6.2024, he has done well since, no complaints off Sotalol, just on xarelto, , denies any issues, he does have OZ    The following portions of the patient's history were reviewed and updated as appropriate: allergies, current medications, past family history, past medical history, past social history, past surgical history, and problem list.    Medications:  acetaminophen  rivaroxaban    Subjective  No Known Allergies     Past Medical History:   Diagnosis Date   • BMI 27.0-27.9,adult    • Mild sleep apnea     cpap compliant, PS 8   • Paroxysmal atrial fibrillation    • Secondary cardiomyopathy        Past Surgical History:   Procedure Laterality Date   • CARDIAC ELECTROPHYSIOLOGY PROCEDURE N/A 9/22/2023    Procedure: PVA, paroxysmal, hold Sotalol 4 days and Eliquis 1 day;  Surgeon: Frank Chi MD;  Location:  [facility] INVASIVE LOCATION;  Service: Cardiovascular;  Laterality: N/A;   • CARDIAC ELECTROPHYSIOLOGY PROCEDURE N/A 6/19/2024    Procedure: Ablation atrial fibrillation. Hold Sotalol 5 days prior and Xarelto the night before the procedure.;  Surgeon: Frank Chi MD;  Location:  [facility] INVASIVE LOCATION;  Service: Cardiovascular;  Laterality: N/A;   • OTHER SURGICAL HISTORY      PVA 09/22/2023 PER DR. CHI   • TONSILLECTOMY     • TRANSESOPHAGEAL ECHOCARDIOGRAM (GISSELL) AND CARDIOVERSION      x2   • WISDOM TOOTH EXTRACTION         Family History   Problem Relation Age of Onset   • No Known Problems Mother    • Prostate cancer Father    • No Known Problems Sister    • Cancer Maternal Grandmother    • Heart attack Maternal Grandfather    •  "Arrhythmia Paternal Grandfather         pacemaker   • Diabetes Other         Social History     Socioeconomic History   • Marital status:    Tobacco Use   • Smoking status: Never     Passive exposure: Past   • Smokeless tobacco: Current     Types: Chew   Vaping Use   • Vaping status: Never Used   Substance and Sexual Activity   • Alcohol use: Yes     Comment: once or twice a month, social   • Drug use: Never   • Sexual activity: Defer       Review of Systems   Constitutional: Negative.    HENT: Negative.     Respiratory: Negative.     Cardiovascular: Negative.    Endocrine: Negative.    Genitourinary: Negative.    Musculoskeletal: Negative.    Skin: Negative.    Allergic/Immunologic: Negative.    Neurological: Negative.    Hematological: Negative.    Psychiatric/Behavioral: Negative.       Cardiovascular Procedures    ECHO/MUGA:  STRESS TESTS:   CARDIAC CATH:   DEVICES:   HOLTER:   CT/MRI:   VASCULAR:   CARDIOTHORACIC:     Objective  Vitals:    10/03/24 0857   BP: 122/80   BP Location: Right arm   Patient Position: Lying   Cuff Size: Adult   Pulse: 80   Resp: 18   Temp: 97 °F (36.1 °C)   TempSrc: Infrared   SpO2: 99%   Weight: 93.9 kg (207 lb)   Height: 180.3 cm (71\")   PainSc: 0-No pain     Body mass index is 28.87 kg/m².     Physical Exam  Vitals reviewed.   Constitutional:       Appearance: Healthy appearance. Not in distress.   Eyes:      Pupils: Pupils are equal, round, and reactive to light.   HENT:    Mouth/Throat:      Pharynx: Oropharynx is clear.   Neck:      Thyroid: Thyroid normal.      Vascular: No JVR. JVD normal.   Pulmonary:      Effort: Pulmonary effort is normal.      Breath sounds: Normal breath sounds. No wheezing. No rhonchi. No rales.   Chest:      Chest wall: Not tender to palpatation.   Cardiovascular:      PMI at left midclavicular line. Normal rate. Regular rhythm. Normal S1. Normal S2.       Murmurs: There is no murmur.      No gallop.  No click. No rub.   Pulses:     Intact distal " pulses.      Carotid: 3+ bilaterally.     Radial: 3+ bilaterally.     Femoral: 3+ bilaterally.     Dorsalis pedis: 3+ bilaterally.     Posterior tibial: 3+ bilaterally.  Edema:     Peripheral edema absent.   Abdominal:      General: Bowel sounds are normal.      Palpations: Abdomen is soft.      Tenderness: There is no abdominal tenderness.   Musculoskeletal: Normal range of motion.         General: No tenderness.      Cervical back: Normal range of motion and neck supple. Skin:     General: Skin is warm and dry.   Neurological:      General: No focal deficit present.      Mental Status: Alert and oriented to person, place and time.        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         Return in about 6 months (around 4/3/2025) for Recheck with Dr. Hernández.    Andrea Hernández MD  10/03/2024

## 2024-12-19 ENCOUNTER — OFFICE VISIT (OUTPATIENT)
Dept: CARDIOLOGY | Facility: CLINIC | Age: 64
End: 2024-12-19
Payer: COMMERCIAL

## 2024-12-19 VITALS
HEIGHT: 72 IN | DIASTOLIC BLOOD PRESSURE: 70 MMHG | BODY MASS INDEX: 28.17 KG/M2 | HEART RATE: 89 BPM | WEIGHT: 208 LBS | OXYGEN SATURATION: 96 % | SYSTOLIC BLOOD PRESSURE: 138 MMHG

## 2024-12-19 DIAGNOSIS — Z86.79 HISTORY OF CARDIOMYOPATHY: Chronic | ICD-10-CM

## 2024-12-19 DIAGNOSIS — R91.1 PULMONARY NODULE: ICD-10-CM

## 2024-12-19 DIAGNOSIS — I49.5 SINUS NODE DYSFUNCTION: Chronic | ICD-10-CM

## 2024-12-19 DIAGNOSIS — I48.19 PERSISTENT ATRIAL FIBRILLATION: Primary | Chronic | ICD-10-CM

## 2024-12-19 NOTE — PROGRESS NOTES
Cardiac Electrophysiology Outpatient Note  [facility] at [facility]    Office Visit     Reed Clarke Jr.  [id number]  12/19/2024    Primary Care Physician: Héctor Kahn MD    Referred By: No ref. provider found    Subjective     Chief Complaint   Patient presents with    Persistent atrial fibrillation       History of Present Illness:   Reed Clarke Jr. is a 64 y.o. male who presents to my electrophysiology clinic for follow up of atrial fibrillation status post ablation in September 2023. This consisted of pulmonary vein isolation, left atrial roofline creation and ablation of typical CTI dependent atrial flutter. Unfortunately, pulmonary vein isolation was quite difficult. He has since had several recurrences of atrial fibrillation. He was restarted on sotalol. He then underwent redo ablation with touchup of the pulmonary veins, roofline, and CTI line.  The patient was last seen in our office in October by Dr. Chi.  Since then he is experienced no palpitations or other reason to think he is had recurrence of arrhythmia.     Past Medical History:   Diagnosis Date    BMI 27.0-27.9,adult     Mild sleep apnea     cpap compliant, PS 8    Paroxysmal atrial fibrillation     Secondary cardiomyopathy        Past Surgical History:   Procedure Laterality Date    CARDIAC ELECTROPHYSIOLOGY PROCEDURE N/A 9/22/2023    Procedure: PVA, paroxysmal, hold Sotalol 4 days and Eliquis 1 day;  Surgeon: Frank Chi MD;  Location: [facility] EP INVASIVE LOCATION;  Service: Cardiovascular;  Laterality: N/A;    CARDIAC ELECTROPHYSIOLOGY PROCEDURE N/A 6/19/2024    Procedure: Ablation atrial fibrillation. Hold Sotalol 5 days prior and Xarelto the night before the procedure.;  Surgeon: Frank Chi MD;  Location: [facility] EP INVASIVE LOCATION;  Service: Cardiovascular;  Laterality: N/A;    OTHER SURGICAL HISTORY      PVA 09/22/2023 PER DR. CHI    TONSILLECTOMY      TRANSESOPHAGEAL ECHOCARDIOGRAM (GISSELL) AND  "CARDIOVERSION      x2    WISDOM TOOTH EXTRACTION         Family History   Problem Relation Age of Onset    No Known Problems Mother     Prostate cancer Father     No Known Problems Sister     Cancer Maternal Grandmother     Heart attack Maternal Grandfather     Arrhythmia Paternal Grandfather         pacemaker    Diabetes Other        Social History     Socioeconomic History    Marital status:    Tobacco Use    Smoking status: Never     Passive exposure: Past    Smokeless tobacco: Current     Types: Chew   Vaping Use    Vaping status: Never Used   Substance and Sexual Activity    Alcohol use: Yes     Comment: once or twice a month, social    Drug use: Never    Sexual activity: Defer         Current Outpatient Medications:     acetaminophen (TYLENOL) 500 MG tablet, Take 1 tablet by mouth Every 6 (Six) Hours As Needed for Mild Pain., Disp: , Rfl:     rivaroxaban (Xarelto) 20 MG tablet, Take 1 tablet by mouth Daily., Disp: 90 tablet, Rfl: 2    Allergies:   No Known Allergies    Objective   Vital Signs: Blood pressure 138/70, pulse 89, height 182.9 cm (72\"), weight 94.3 kg (208 lb), SpO2 96%.    PHYSICAL EXAM  General appearance: Awake, alert, cooperative  Head: Normocephalic, without obvious abnormality, atraumatic  Lungs: Clear to ascultation bilaterally  Heart: Regular rate and rhythm, no murmurs, no lower extremity swelling  Skin: Skin color, turgor normal, no rashes or lesions  Neurologic: Grossly normal     Lab Results   Component Value Date    GLUCOSE 102 (H) 06/17/2024    CALCIUM 9.5 06/17/2024     06/17/2024    K 4.8 06/17/2024    CO2 27.0 06/17/2024     06/17/2024    BUN 16 06/17/2024    CREATININE 1.13 06/17/2024    BCR 14.2 06/17/2024    ANIONGAP 10.0 06/17/2024     Lab Results   Component Value Date    WBC 7.02 06/17/2024    HGB 15.5 06/17/2024    HCT 48.5 06/17/2024    MCV 92.4 06/17/2024     06/17/2024     No results found for: \"INR\", \"PROTIME\"  Lab Results   Component Value Date "    TSH 1.150 06/17/2024          Results for orders placed in visit on 09/06/22    Adult Transthoracic Echo Complete W/ Cont if Necessary Per Protocol    Interpretation Summary  · Estimated left ventricular EF = 62% Estimated left ventricular EF was in agreement with the calculated left ventricular EF. Left ventricular ejection fraction appears to be 61 - 65%. Left ventricular systolic function is normal.  · Left ventricular diastolic function is consistent with (grade II w/high LAP) pseudonormalization.  · Left ventricular mass index is increased.  · Left atrial volume is mildly increased.  · The right atrial cavity is mildly dilated.  · There is moderate, bileaflet mitral valve thickening present.  · Mild mitral valve regurgitation is present.  · Moderate tricuspid valve regurgitation is present.  · Estimated right ventricular systolic pressure from tricuspid regurgitation is moderately elevated (45-55 mmHg).  · The right ventricular cavity is mildly dilated.  · Mild aortic sclerosis  · Moderate TR, Mild MR.  · No pericardial effusion         I personally viewed and interpreted the patient's EKG/Telemetry/lab data      ECG 12 Lead    Date/Time: 12/20/2024 3:38 PM  Performed by: Tyrone Teague PA-C    Authorized by: Tyrone Teague PA-C  Comparison: compared with previous ECG from 10/3/2024  Similar to previous ECG  Rhythm: sinus rhythm  Rate: normal  BPM: 72    Clinical impression: normal ECG          Reed Clarke Jr.  reports that he has never smoked. He has been exposed to tobacco smoke. His smokeless tobacco use includes chew.     Advance Care Planning   Advance Care Planning: ACP discussion was declined by the patient. Patient has an advance directive (not in EMR), copy requested.     Assessment & Plan    1. Persistent atrial fibrillation  S/p redo ablation 6/2024 with touchup of the pulmonary veins, roofline and CTI line.  He has done well since then outside of the initial 3-month recovery..  Plan to  continue Xarelto.  We discussed options to come off Xarelto with routine rhythm analysis at home at next visit but it was stressed that safest way to prevent stroke long-term is with continued daily NOAC.  He will continue Xarelto for now.    2. History of cardiomyopathy  History of nonischemic cardiomyopathy with recovered ejection fraction.  Heart failure management per Dr. Gill.  Euvolemic on exam.  NYHA class:    3. Sinus node dysfunction  Asymptomatic with acceptable heart rate today.  Avoid AV celeste blockers at least high dose    4. Pulmonary nodule  We will set up patient for follow-up CT without to assess interval change in the pulmonary nodule.  If no change, then no further routine monitoring is necessary       Follow Up: 6/26/2025      Thank you for allowing me to participate in the care of your patient. Please do not hesitate to contact me with additional questions or concerns.      Tyrone Teague PA-C  Cardiac Electrophysiology  [facility] / [facility]

## 2024-12-20 DIAGNOSIS — R91.1 LUNG NODULE: Primary | ICD-10-CM

## 2024-12-20 PROBLEM — I49.5 SINUS NODE DYSFUNCTION: Status: ACTIVE | Noted: 2024-12-20

## 2024-12-28 ENCOUNTER — HOSPITAL ENCOUNTER (OUTPATIENT)
Dept: CT IMAGING | Facility: HOSPITAL | Age: 64
Discharge: HOME | End: 2024-12-28
Payer: COMMERCIAL

## 2024-12-28 DIAGNOSIS — R91.1 LUNG NODULE: ICD-10-CM

## 2024-12-28 PROCEDURE — 71250 CT THORAX DX C-: CPT

## 2025-01-07 ENCOUNTER — TELEPHONE (OUTPATIENT)
Dept: CARDIOLOGY | Facility: CLINIC | Age: 65
End: 2025-01-07
Payer: COMMERCIAL

## 2025-01-07 NOTE — TELEPHONE ENCOUNTER
Patient called today for follow up concerning repeat CT done on 12/28. Would like to know results.

## 2025-01-08 NOTE — TELEPHONE ENCOUNTER
Called patient with results. Patient verbalized understanding. No further questions/concerns at this time. Pt will call with any additional questions/concerns.

## 2025-04-03 ENCOUNTER — OFFICE VISIT (OUTPATIENT)
Dept: CARDIOLOGY | Facility: CLINIC | Age: 65
End: 2025-04-03
Payer: COMMERCIAL

## 2025-04-03 VITALS
TEMPERATURE: 97 F | SYSTOLIC BLOOD PRESSURE: 124 MMHG | HEART RATE: 86 BPM | OXYGEN SATURATION: 96 % | HEIGHT: 72 IN | RESPIRATION RATE: 18 BRPM | WEIGHT: 216 LBS | BODY MASS INDEX: 29.26 KG/M2 | DIASTOLIC BLOOD PRESSURE: 84 MMHG

## 2025-04-03 DIAGNOSIS — G47.33 OBSTRUCTIVE SLEEP APNEA ON CPAP: ICD-10-CM

## 2025-04-03 DIAGNOSIS — I25.10 CORONARY ARTERY DISEASE INVOLVING NATIVE CORONARY ARTERY OF NATIVE HEART WITHOUT ANGINA PECTORIS: ICD-10-CM

## 2025-04-03 DIAGNOSIS — Z86.79 HISTORY OF CARDIOMYOPATHY: Primary | ICD-10-CM

## 2025-04-03 DIAGNOSIS — I48.19 PERSISTENT ATRIAL FIBRILLATION: ICD-10-CM

## 2025-04-03 PROBLEM — I49.5 SINUS NODE DYSFUNCTION: Status: RESOLVED | Noted: 2024-12-20 | Resolved: 2025-04-03

## 2025-04-03 NOTE — PROGRESS NOTES
[facility] [place]  [facility] CARDIOLOGY  [address] [place] [address]  Dept: [phone]  Dept Fax: [phone]    Reed Clarke Jr.  1960    Follow Up Office Visit Note    History of Present Illness:  Reed Clarke Jr. is a 64 y.o. male who presents to the clinic for persistent atrial fibrillation- He underwent ablation and since then is doing well , no complaints, still on Xarelto 20 mg, will keep it might need need to downgrade., he also has had a Ct chest and found to have calcifications of the coronary arteries, we need a lipid profile and we might start ASA on the future, he does not have CP, BP is 120.80 EKG sinus no ischemic changes     The following portions of the patient's history were reviewed and updated as appropriate: allergies, current medications, past family history, past medical history, past social history, past surgical history, and problem list.    Medications:  acetaminophen  rivaroxaban    Subjective  No Known Allergies     Past Medical History:   Diagnosis Date   • BMI 27.0-27.9,adult    • Mild sleep apnea     cpap compliant, PS 8   • Paroxysmal atrial fibrillation    • Secondary cardiomyopathy        Past Surgical History:   Procedure Laterality Date   • CARDIAC ELECTROPHYSIOLOGY PROCEDURE N/A 9/22/2023    Procedure: PVA, paroxysmal, hold Sotalol 4 days and Eliquis 1 day;  Surgeon: Frank Chi MD;  Location: [facility] EP INVASIVE LOCATION;  Service: Cardiovascular;  Laterality: N/A;   • CARDIAC ELECTROPHYSIOLOGY PROCEDURE N/A 6/19/2024    Procedure: Ablation atrial fibrillation. Hold Sotalol 5 days prior and Xarelto the night before the procedure.;  Surgeon: Frank Chi MD;  Location:  [facility] EP INVASIVE LOCATION;  Service: Cardiovascular;  Laterality: N/A;   • OTHER SURGICAL HISTORY      PVA 09/22/2023 PER DR. CHI   • TONSILLECTOMY     • TRANSESOPHAGEAL ECHOCARDIOGRAM (GISSELL) AND CARDIOVERSION      x2   • WISDOM TOOTH EXTRACTION         Family History  "  Problem Relation Age of Onset   • No Known Problems Mother    • Prostate cancer Father    • No Known Problems Sister    • Cancer Maternal Grandmother    • Heart attack Maternal Grandfather    • Arrhythmia Paternal Grandfather         pacemaker   • Diabetes Other         Social History     Socioeconomic History   • Marital status:    Tobacco Use   • Smoking status: Never     Passive exposure: Past   • Smokeless tobacco: Current     Types: Chew   Vaping Use   • Vaping status: Never Used   Substance and Sexual Activity   • Alcohol use: Yes     Comment: once or twice a month, social   • Drug use: Never   • Sexual activity: Defer       Review of Systems   Constitutional: Negative.    HENT: Negative.     Respiratory: Negative.     Cardiovascular: Negative.    Endocrine: Negative.    Genitourinary: Negative.    Musculoskeletal: Negative.    Skin: Negative.    Allergic/Immunologic: Negative.    Neurological: Negative.    Hematological: Negative.    Psychiatric/Behavioral: Negative.       Cardiovascular Procedures    ECHO/MUGA:  STRESS TESTS:   CARDIAC CATH:   DEVICES:   HOLTER:   CT/MRI:   VASCULAR:   CARDIOTHORACIC:     Objective  Vitals:    04/03/25 1002   BP: 124/84   BP Location: Right arm   Patient Position: Sitting   Cuff Size: Adult   Pulse: 86   Resp: 18   Temp: 97 °F (36.1 °C)   TempSrc: Infrared   SpO2: 96%   Weight: 98 kg (216 lb)   Height: 182.9 cm (72\")   PainSc: 0-No pain     Body mass index is 29.29 kg/m².     Physical Exam  Vitals reviewed.   Constitutional:       Appearance: Healthy appearance. Not in distress.   Eyes:      Pupils: Pupils are equal, round, and reactive to light.   HENT:    Mouth/Throat:      Pharynx: Oropharynx is clear.   Neck:      Thyroid: Thyroid normal.      Vascular: No JVR. JVD normal.   Pulmonary:      Effort: Pulmonary effort is normal.      Breath sounds: Normal breath sounds. No wheezing. No rhonchi. No rales.   Chest:      Chest wall: Not tender to palpatation. "   Cardiovascular:      PMI at left midclavicular line. Normal rate. Regular rhythm. Normal S1. Normal S2.       Murmurs: There is no murmur.      No gallop.  No click. No rub.   Pulses:     Intact distal pulses.      Carotid: 3+ bilaterally.     Radial: 3+ bilaterally.     Femoral: 3+ bilaterally.     Dorsalis pedis: 3+ bilaterally.     Posterior tibial: 3+ bilaterally.  Edema:     Peripheral edema absent.   Abdominal:      General: Bowel sounds are normal.      Palpations: Abdomen is soft.      Tenderness: There is no abdominal tenderness.   Musculoskeletal: Normal range of motion.         General: No tenderness.      Cervical back: Normal range of motion and neck supple. Skin:     General: Skin is warm and dry.   Neurological:      General: No focal deficit present.      Mental Status: Alert and oriented to person, place and time.        Diagnostic Data    ECG 12 Lead    Date/Time: 4/3/2025 10:41 AM  Performed by: Andrea Hernández MD    Authorized by: Andrea Hernández MD  Comparison: compared with previous ECG from 12/20/2024  Similar to previous ECG  Rhythm: sinus rhythm  Rate: normal  BPM: 76  QRS axis: normal    Clinical impression: normal ECG        Assessment and Plan  Diagnoses and all orders for this visit:    History of cardiomyopathy- with recovery of the Ef to normal on no meds, now doing good    Persistent atrial fibrillation= s.p ablation asymptomatic, doing good, still on Xarelto    Obstructive sleep apnea on CPAP-       Coronary artery disease involving native coronary artery of native heart without angina pectoris- By Ct chest , asymptomatic, we need a Lipid profile      and will need statin father has had stent         Return in about 6 months (around 10/3/2025) for Recheck with Dr. Hernández.    Andrea Hernández MD  04/03/2025

## 2025-04-04 ENCOUNTER — CLINICAL SUPPORT (OUTPATIENT)
Dept: CARDIOLOGY | Facility: CLINIC | Age: 65
End: 2025-04-04
Payer: COMMERCIAL

## 2025-04-04 DIAGNOSIS — G47.33 OBSTRUCTIVE SLEEP APNEA ON CPAP: ICD-10-CM

## 2025-04-04 DIAGNOSIS — Z86.79 HISTORY OF CARDIOMYOPATHY: Primary | ICD-10-CM

## 2025-04-04 DIAGNOSIS — I48.19 ATRIAL FIBRILLATION, PERSISTENT: ICD-10-CM

## 2025-04-04 DIAGNOSIS — I27.20 PULMONARY HTN: ICD-10-CM

## 2025-04-04 DIAGNOSIS — I25.10 CORONARY ARTERY DISEASE INVOLVING NATIVE CORONARY ARTERY OF NATIVE HEART WITHOUT ANGINA PECTORIS: ICD-10-CM

## 2025-04-04 DIAGNOSIS — R06.02 SOB (SHORTNESS OF BREATH) ON EXERTION: ICD-10-CM

## 2025-04-04 DIAGNOSIS — I48.19 PERSISTENT ATRIAL FIBRILLATION: ICD-10-CM

## 2025-04-04 DIAGNOSIS — I42.0 CARDIOMYOPATHY, DILATED: ICD-10-CM

## 2025-04-04 NOTE — PROGRESS NOTES
Venipuncture Blood Specimen Collection  Venipuncture performed in clinic by Nahed Knight MA with good hemostasis. Patient tolerated the procedure well without complications.   04/04/25   Nahed Knight MA

## 2025-04-07 ENCOUNTER — RESULTS FOLLOW-UP (OUTPATIENT)
Dept: CARDIOLOGY | Facility: CLINIC | Age: 65
End: 2025-04-07
Payer: COMMERCIAL

## 2025-04-07 LAB
ALBUMIN SERPL-MCNC: 4 G/DL (ref 3.9–4.9)
ALP SERPL-CCNC: 93 IU/L (ref 44–121)
ALT SERPL-CCNC: 14 IU/L (ref 0–44)
AST SERPL-CCNC: 16 IU/L (ref 0–40)
BASOPHILS # BLD AUTO: 0 X10E3/UL (ref 0–0.2)
BASOPHILS NFR BLD AUTO: 1 %
BILIRUB SERPL-MCNC: 0.5 MG/DL (ref 0–1.2)
BUN SERPL-MCNC: 10 MG/DL (ref 8–27)
BUN/CREAT SERPL: 11 (ref 10–24)
CALCIUM SERPL-MCNC: 9.4 MG/DL (ref 8.6–10.2)
CHLORIDE SERPL-SCNC: 106 MMOL/L (ref 96–106)
CHOLEST SERPL-MCNC: 213 MG/DL (ref 100–199)
CK SERPL-CCNC: 58 U/L (ref 41–331)
CO2 SERPL-SCNC: 24 MMOL/L (ref 20–29)
CREAT SERPL-MCNC: 0.93 MG/DL (ref 0.76–1.27)
EGFRCR SERPLBLD CKD-EPI 2021: 92 ML/MIN/1.73
EOSINOPHIL # BLD AUTO: 0.2 X10E3/UL (ref 0–0.4)
EOSINOPHIL NFR BLD AUTO: 2 %
ERYTHROCYTE [DISTWIDTH] IN BLOOD BY AUTOMATED COUNT: 12.3 % (ref 11.6–15.4)
GLOBULIN SER CALC-MCNC: 2.8 G/DL (ref 1.5–4.5)
GLUCOSE SERPL-MCNC: 91 MG/DL (ref 70–99)
HCT VFR BLD AUTO: 47.9 % (ref 37.5–51)
HDLC SERPL-MCNC: 45 MG/DL
HGB BLD-MCNC: 15.4 G/DL (ref 13–17.7)
IMM GRANULOCYTES # BLD AUTO: 0 X10E3/UL (ref 0–0.1)
IMM GRANULOCYTES NFR BLD AUTO: 0 %
LDLC SERPL CALC-MCNC: 150 MG/DL (ref 0–99)
LPA SERPL-SCNC: 264.6 NMOL/L
LYMPHOCYTES # BLD AUTO: 2.3 X10E3/UL (ref 0.7–3.1)
LYMPHOCYTES NFR BLD AUTO: 30 %
MCH RBC QN AUTO: 29 PG (ref 26.6–33)
MCHC RBC AUTO-ENTMCNC: 32.2 G/DL (ref 31.5–35.7)
MCV RBC AUTO: 90 FL (ref 79–97)
MONOCYTES # BLD AUTO: 0.8 X10E3/UL (ref 0.1–0.9)
MONOCYTES NFR BLD AUTO: 10 %
NEUTROPHILS # BLD AUTO: 4.5 X10E3/UL (ref 1.4–7)
NEUTROPHILS NFR BLD AUTO: 57 %
PLATELET # BLD AUTO: 326 X10E3/UL (ref 150–450)
POTASSIUM SERPL-SCNC: 4.5 MMOL/L (ref 3.5–5.2)
PROT SERPL-MCNC: 6.8 G/DL (ref 6–8.5)
RBC # BLD AUTO: 5.31 X10E6/UL (ref 4.14–5.8)
SODIUM SERPL-SCNC: 143 MMOL/L (ref 134–144)
TRIGL SERPL-MCNC: 101 MG/DL (ref 0–149)
VLDLC SERPL CALC-MCNC: 18 MG/DL (ref 5–40)
WBC # BLD AUTO: 7.8 X10E3/UL (ref 3.4–10.8)

## 2025-04-08 RX ORDER — ROSUVASTATIN CALCIUM 20 MG/1
20 TABLET, COATED ORAL DAILY
COMMUNITY
End: 2025-04-08 | Stop reason: SDUPTHER

## 2025-04-08 RX ORDER — ROSUVASTATIN CALCIUM 20 MG/1
20 TABLET, COATED ORAL DAILY
Qty: 90 TABLET | Refills: 2 | Status: SHIPPED | OUTPATIENT
Start: 2025-04-08

## 2025-04-08 NOTE — TELEPHONE ENCOUNTER
Crestor 20 mg to treat LDL but he also has lp a elevated will need repatha, vs Leqvio . Spoke with pt he is going to use the repatha and I will send in both .

## 2025-04-09 LAB — APO B SERPL-MCNC: 118 MG/DL

## 2025-04-11 ENCOUNTER — TELEPHONE (OUTPATIENT)
Dept: CARDIOLOGY | Facility: CLINIC | Age: 65
End: 2025-04-11
Payer: COMMERCIAL

## 2025-04-11 NOTE — TELEPHONE ENCOUNTER
PT HAS A FEW QUESTIONS    WONDERING ABOUT PERCENTAGE OF BLOCKAGES HE HAS  IF HE IS SUPPOSED TO BE TAKING HIS XALERTO WITH HIS OTHER NEW MEDICATIONS (THE CRESTOR AND REPATHA)  WANTED TO DISCUSS WHAT THE REPATHA AND XALERTO ARE FOR      PLEASE ADVISE PT

## 2025-04-14 NOTE — TELEPHONE ENCOUNTER
Spoke with Mr Vince and advised him that yes they see calcium in his coronary arteries and states moderate, however, we can not give you a percentage just based off CT however since you are not having chest pain or issues we treat medically for now with repatha and crestor.  You can also take the xarelto with all your meds.  He verbalized understanding.

## 2025-06-26 ENCOUNTER — OFFICE VISIT (OUTPATIENT)
Dept: CARDIOLOGY | Facility: CLINIC | Age: 65
End: 2025-06-26
Payer: COMMERCIAL

## 2025-06-26 VITALS
SYSTOLIC BLOOD PRESSURE: 104 MMHG | WEIGHT: 202 LBS | DIASTOLIC BLOOD PRESSURE: 66 MMHG | HEIGHT: 71 IN | HEART RATE: 69 BPM | OXYGEN SATURATION: 95 % | BODY MASS INDEX: 28.28 KG/M2

## 2025-06-26 DIAGNOSIS — I49.5 SINUS NODE DYSFUNCTION: ICD-10-CM

## 2025-06-26 DIAGNOSIS — I42.0 CARDIOMYOPATHY, DILATED: ICD-10-CM

## 2025-06-26 DIAGNOSIS — G47.33 OBSTRUCTIVE SLEEP APNEA: ICD-10-CM

## 2025-06-26 DIAGNOSIS — I48.0 AF (PAROXYSMAL ATRIAL FIBRILLATION): Primary | ICD-10-CM

## 2025-06-26 NOTE — PROGRESS NOTES
"                   Cardiac Electrophysiology Outpatient Follow Up Note            [facility]    Follow Up Office Visit      Reed Clarke Jr.  [id number]  06/26/2025  [unfilled]  [unfilled]    Primary Care Physician: Héctor Kahn MD    Referred By: No ref. provider found    Subjective     Chief Complaint   Patient presents with    Persistent atrial fibrillation       History of Present Illness:   Reed Clarke Jr. is a 64 y.o. male who presents to my electrophysiology clinic for follow up of atrial fibrillation.  He underwent pulmonary vein ablation in September 2023. This consisted of pulmonary vein isolation, left atrial roofline creation and ablation of typical CTI dependent atrial flutter. Unfortunately, pulmonary vein isolation was quite difficult. He has since had several recurrences of atrial fibrillation. He was restarted on sotalol. He then underwent redo ablation with touchup of the pulmonary veins, roofline, and CTI line.  He is chronically anticoagulated with Xarelto 20 mg p.o. daily    Since the patients last visit he has been doing fairly well.  The patient denies chest pain, chest pressure, chest heaviness, palpitations, shortness of breath, edema, syncope, presyncope.     He does take his blood pressure regularly and use a Kardia device.  He sometimes have some \"unclassified or possible ablation\"  He has not had any readings since last Friday.  He remained asymptomatic.     Past Medical History:   Diagnosis Date    BMI 27.0-27.9,adult     Mild sleep apnea     cpap compliant, PS 8    Paroxysmal atrial fibrillation     Secondary cardiomyopathy        Past Surgical History:   Procedure Laterality Date    CARDIAC ELECTROPHYSIOLOGY PROCEDURE N/A 9/22/2023    Procedure: PVA, paroxysmal, hold Sotalol 4 days and Eliquis 1 day;  Surgeon: Frank Chi MD;  Location: [facility] INVASIVE LOC[facility];  Service: Cardiovascular;  Laterality: N/A;    CARDIAC " "ELECTROPHYSIOLOGY PROCEDURE N/A 6/19/2024    Procedure: Ablation atrial fibrillation. Hold Sotalol 5 days prior and Xarelto the night before the procedure.;  Surgeon: Frank Chi MD;  Location: [facility] INVASIVE LOCATION;  Service: Cardiovascular;  Laterality: N/A;    OTHER SURGICAL HISTORY      PVA 09/22/2023 PER DR. CHI    TONSILLECTOMY      TRANSESOPHAGEAL ECHOCARDIOGRAM (GISSELL) AND CARDIOVERSION      x2    WISDOM TOOTH EXTRACTION         Family History   Problem Relation Age of Onset    No Known Problems Mother     Prostate cancer Father     No Known Problems Sister     Cancer Maternal Grandmother     Heart attack Maternal Grandfather     Arrhythmia Paternal Grandfather         pacemaker    Diabetes Other        Social History     Socioeconomic History    Marital status:    Tobacco Use    Smoking status: Never     Passive exposure: Past    Smokeless tobacco: Current     Types: Chew   Vaping Use    Vaping status: Never Used   Substance and Sexual Activity    Alcohol use: Yes     Comment: once or twice a month, social    Drug use: Never    Sexual activity: Defer         Current Outpatient Medications:     acetaminophen (TYLENOL) 500 MG tablet, Take 1 tablet by mouth Every 6 (Six) Hours As Needed for Mild Pain., Disp: , Rfl:     Evolocumab (REPATHA) solution auto-injector SureClick injection, Inject 1 mL under the skin into the appropriate area as directed Every 14 (Fourteen) Days., Disp: 6 mL, Rfl: 3    rivaroxaban (Xarelto) 20 MG tablet, Take 1 tablet by mouth Daily., Disp: 90 tablet, Rfl: 2    rosuvastatin (CRESTOR) 20 MG tablet, Take 1 tablet by mouth Daily., Disp: 90 tablet, Rfl: 2    No Known Allergies    Objective     Vitals:    06/26/25 1006   BP: 104/66   BP Location: Left arm   Patient Position: Sitting   Pulse: 69   SpO2: 95%   Weight: 91.6 kg (202 lb)   Height: 180.3 cm (71\")     Body mass index is 28.17 kg/m².    Constitutional:       Appearance: Normal and healthy appearance. Not in " "distress.   HENT:      Head: Normocephalic and atraumatic.   Pulmonary:      Effort: Pulmonary effort is normal.      Breath sounds: Normal breath sounds.   Cardiovascular:      PMI at left midclavicular line. Normal rate. Regular rhythm. Normal S1. Normal S2.       Murmurs: There is no murmur.      No gallop.  No click. No rub.   Pulses:     Intact distal pulses.   Edema:     Peripheral edema absent.   Abdominal:      General: Bowel sounds are normal.      Palpations: Abdomen is soft.   Skin:     General: Skin is warm and dry.   Neurological:      Mental Status: Alert and oriented to person, place and time.   Psychiatric:         Behavior: Behavior is cooperative.         Lab Results   Component Value Date    GLUCOSE 91 04/04/2025    CALCIUM 9.4 04/04/2025     04/04/2025    K 4.5 04/04/2025    CO2 24 04/04/2025     04/04/2025    BUN 10 04/04/2025    CREATININE 0.93 04/04/2025    BCR 11 04/04/2025    ANIONGAP 10.0 06/17/2024     Lab Results   Component Value Date    WBC 7.8 04/04/2025    HGB 15.4 04/04/2025    HCT 47.9 04/04/2025    MCV 90 04/04/2025     04/04/2025     No results found for: \"INR\", \"PROTIME\"  Lab Results   Component Value Date    TSH 1.150 06/17/2024       Cardiac Testing:     I personally viewed and interpreted the patient's EKG/Telemetry/lab data.      ECG 12 Lead    Date/Time: 6/26/2025 10:21 AM  Performed by: Mattie Haney APRN    Authorized by: Mattie Haney APRN  Rhythm: sinus rhythm  Rate: normal  Conduction: conduction normal  ST Segments: ST segments normal  T Waves: T waves normal  Other: no other findings    Clinical impression: normal ECG          Tobacco Cessation: N/A  Obstructive Sleep Apnea Screening: Completed    Advance Care Planning   ACP discussion was held with the patient during this visit. Patient does not have an advance directive, declines further assistance.       Assessment & Plan      Assessment & Plan  AF (paroxysmal atrial " fibrillation)  Anticoagulated with Xarelto  Patient denies recurrence of atrial fibrillation since he underwent his ablation.  No changes in current medication regimen  Orders:    ECG 12 Lead    Cardiomyopathy, dilated    Thought to be arrhythmia driven, LVEF improved per TTE on 9/2022 LVEF 61-65%       Sinus node dysfunction  Remains asymptomatic.  Avoid AV celeste blockade        Obstructive sleep apnea  CPAP compliant              Follow Up:   No follow-ups on file.    Thank you for allowing me to participate in the care of your patient. Please to not hesitate to contact me with additional questions or concerns.      SITA Barreto  [facility] / [facility]

## (undated) DEVICE — BI-DIRECTIONAL NAVIGATION CATHETER, NAV, D-F: Brand: QDOT MICRO

## (undated) DEVICE — SET PRIMARY GRVTY 10DP MALE LL 104IN

## (undated) DEVICE — NDL PERC 1PART ECHOTIP WO/BASEPLT 18G 7CM

## (undated) DEVICE — TBG SXN ESOPH ENSOETM FOR/BLANETROL/1/2

## (undated) DEVICE — Device: Brand: MEDEX

## (undated) DEVICE — DECANT BG O JET

## (undated) DEVICE — Device: Brand: REFERENCE PATCH CARTO 3

## (undated) DEVICE — SOL NACL 0.9PCT 1000ML

## (undated) DEVICE — Device: Brand: SOUNDSTAR

## (undated) DEVICE — SYS CLS VASC/VENI VASCADE MVP 6TO12F

## (undated) DEVICE — PINNACLE INTRODUCER SHEATH: Brand: PINNACLE

## (undated) DEVICE — PRESSURE MONITORING SET: Brand: TRUWAVE

## (undated) DEVICE — STERILE (15.2 TAPERED TO 7.6 X 183CM) POLYETHYLENE ACCORDION-FOLDED COVER FOR USE WITH SIEMENS ACUNAV ULTRASOUND CATHETER FAMILY CONNECTOR: Brand: SWIFTLINK TRANSDUCER COVER

## (undated) DEVICE — ADULT, W/LG. BACK PAD, RADIOTRANSPARENT ELEMENT AND LEAD WIRE COMPATIBLE W/: Brand: DEFIBRILLATION ELECTRODES

## (undated) DEVICE — 1 X VERSACROSS TRANSSEPTAL SHEATH (INCLUDING  1 X J-TIP GUIDEWIRE); 1 X VERSACROSS RF WIRE (INCLUDING 1 X CONNECTOR CABLE (SINGLE USE)); 1 X DISPERSIVE ELECTRODE: Brand: VERSACROSS ACCESS SOLUTION

## (undated) DEVICE — ST EXT IV LG BORE NDLESS FLTR LL 17IN

## (undated) DEVICE — DRSNG SURESITE123 4X4.8IN

## (undated) DEVICE — OCTA,GALAXY,3-3-3-3-3,D-CURVE: Brand: OCTARAY MAPPING CATHETER

## (undated) DEVICE — ST INF PRI SMRTSTE 20DRP 2VLV 24ML 117

## (undated) DEVICE — Device: Brand: WEBSTER CS

## (undated) DEVICE — PAD GRND REM POLYHESIVE A/ DISP

## (undated) DEVICE — SHEATH GUIDE EP CARTO VIZIGO BIDIR 8.5F MD/CRV/22MM 71CM IDE

## (undated) DEVICE — INTRO SHEATH FAST/CATH LG/LUM 11F .038IN 12CM

## (undated) DEVICE — TBG PRESS MON 48IN M/F L/L: Brand: MEDLINE INDUSTRIES, INC.

## (undated) DEVICE — Device: Brand: VIZIGO

## (undated) DEVICE — LEX ELECTRO PHYSIOLOGY: Brand: MEDLINE INDUSTRIES, INC.

## (undated) DEVICE — ST EXT IV SMARTSITE PINCH/CLMP 5ML 46CM

## (undated) DEVICE — ELECTRD RETRN/GRND MEGADYNE SGL/PLT W/CORD 9FT DISP

## (undated) DEVICE — Device: Brand: SMARTABLATE

## (undated) DEVICE — ST EXT IV SMRTSTE 2VLV FIX M LL 6ML 41